# Patient Record
Sex: FEMALE | Race: ASIAN | NOT HISPANIC OR LATINO | Employment: FULL TIME | URBAN - METROPOLITAN AREA
[De-identification: names, ages, dates, MRNs, and addresses within clinical notes are randomized per-mention and may not be internally consistent; named-entity substitution may affect disease eponyms.]

---

## 2017-01-25 ENCOUNTER — AMBULATORY - HEALTHEAST (OUTPATIENT)
Dept: NURSING | Facility: CLINIC | Age: 47
End: 2017-01-25

## 2017-04-26 ENCOUNTER — AMBULATORY - HEALTHEAST (OUTPATIENT)
Dept: NURSING | Facility: CLINIC | Age: 47
End: 2017-04-26

## 2017-07-20 ENCOUNTER — AMBULATORY - HEALTHEAST (OUTPATIENT)
Dept: NURSING | Facility: CLINIC | Age: 47
End: 2017-07-20

## 2017-08-19 ENCOUNTER — COMMUNICATION - HEALTHEAST (OUTPATIENT)
Dept: FAMILY MEDICINE | Facility: CLINIC | Age: 47
End: 2017-08-19

## 2017-08-19 DIAGNOSIS — E03.9 HYPOTHYROIDISM: ICD-10-CM

## 2017-09-16 ENCOUNTER — COMMUNICATION - HEALTHEAST (OUTPATIENT)
Dept: FAMILY MEDICINE | Facility: CLINIC | Age: 47
End: 2017-09-16

## 2017-10-12 ENCOUNTER — OFFICE VISIT - HEALTHEAST (OUTPATIENT)
Dept: FAMILY MEDICINE | Facility: CLINIC | Age: 47
End: 2017-10-12

## 2017-10-12 DIAGNOSIS — E03.9 HYPOTHYROIDISM: ICD-10-CM

## 2017-10-12 DIAGNOSIS — E11.9 TYPE 2 DIABETES MELLITUS WITHOUT COMPLICATION, WITHOUT LONG-TERM CURRENT USE OF INSULIN (H): ICD-10-CM

## 2017-10-12 DIAGNOSIS — L20.82 FLEXURAL ECZEMA: ICD-10-CM

## 2017-10-12 DIAGNOSIS — Z00.00 ROUTINE GENERAL MEDICAL EXAMINATION AT A HEALTH CARE FACILITY: ICD-10-CM

## 2017-10-12 LAB
HBA1C MFR BLD: 7.1 % (ref 3.5–6)
LDLC SERPL CALC-MCNC: 78 MG/DL

## 2017-10-12 ASSESSMENT — MIFFLIN-ST. JEOR: SCORE: 1083.76

## 2017-10-13 LAB — HBV SURFACE AB SERPL IA-ACNC: NEGATIVE M[IU]/ML

## 2017-10-17 ENCOUNTER — AMBULATORY - HEALTHEAST (OUTPATIENT)
Dept: FAMILY MEDICINE | Facility: CLINIC | Age: 47
End: 2017-10-17

## 2017-10-17 ENCOUNTER — COMMUNICATION - HEALTHEAST (OUTPATIENT)
Dept: FAMILY MEDICINE | Facility: CLINIC | Age: 47
End: 2017-10-17

## 2017-11-17 ENCOUNTER — OFFICE VISIT - HEALTHEAST (OUTPATIENT)
Dept: FAMILY MEDICINE | Facility: CLINIC | Age: 47
End: 2017-11-17

## 2017-11-17 DIAGNOSIS — R47.01 APHASIC DISTURBANCE: ICD-10-CM

## 2017-11-17 DIAGNOSIS — E11.9 TYPE 2 DIABETES MELLITUS WITHOUT COMPLICATION (H): ICD-10-CM

## 2017-11-17 DIAGNOSIS — T38.2X5S: ICD-10-CM

## 2017-11-24 ENCOUNTER — HOSPITAL ENCOUNTER (OUTPATIENT)
Dept: CT IMAGING | Facility: CLINIC | Age: 47
Discharge: HOME | End: 2017-11-24
Attending: FAMILY MEDICINE

## 2017-11-24 DIAGNOSIS — R47.01 APHASIC DISTURBANCE: ICD-10-CM

## 2017-11-26 ENCOUNTER — COMMUNICATION - HEALTHEAST (OUTPATIENT)
Dept: NEUROSURGERY | Facility: CLINIC | Age: 47
End: 2017-11-26

## 2017-11-26 ENCOUNTER — COMMUNICATION - HEALTHEAST (OUTPATIENT)
Dept: FAMILY MEDICINE | Facility: CLINIC | Age: 47
End: 2017-11-26

## 2017-11-26 DIAGNOSIS — E03.9 HYPOTHYROIDISM: ICD-10-CM

## 2017-11-27 ENCOUNTER — COMMUNICATION - HEALTHEAST (OUTPATIENT)
Dept: NEUROSURGERY | Facility: CLINIC | Age: 47
End: 2017-11-27

## 2017-11-27 ENCOUNTER — COMMUNICATION - HEALTHEAST (OUTPATIENT)
Dept: FAMILY MEDICINE | Facility: CLINIC | Age: 47
End: 2017-11-27

## 2017-11-27 ENCOUNTER — AMBULATORY - HEALTHEAST (OUTPATIENT)
Dept: INTENSIVE CARE | Facility: CLINIC | Age: 47
End: 2017-11-27

## 2017-11-27 ENCOUNTER — RECORDS - HEALTHEAST (OUTPATIENT)
Dept: ADMINISTRATIVE | Facility: OTHER | Age: 47
End: 2017-11-27

## 2017-11-27 ENCOUNTER — AMBULATORY - HEALTHEAST (OUTPATIENT)
Dept: NEUROSURGERY | Facility: CLINIC | Age: 47
End: 2017-11-27

## 2017-11-27 DIAGNOSIS — D32.9 MENINGIOMA (H): ICD-10-CM

## 2017-11-27 DIAGNOSIS — Z01.818 PRE-OP EVALUATION: ICD-10-CM

## 2017-11-28 ENCOUNTER — AMBULATORY - HEALTHEAST (OUTPATIENT)
Dept: LAB | Facility: CLINIC | Age: 47
End: 2017-11-28

## 2017-11-28 ENCOUNTER — OFFICE VISIT - HEALTHEAST (OUTPATIENT)
Dept: NEUROSURGERY | Facility: CLINIC | Age: 47
End: 2017-11-28

## 2017-11-28 DIAGNOSIS — Z01.818 PRE-OP EVALUATION: ICD-10-CM

## 2017-11-28 DIAGNOSIS — D32.9 MENINGIOMA (H): ICD-10-CM

## 2017-11-29 ENCOUNTER — AMBULATORY - HEALTHEAST (OUTPATIENT)
Dept: NEUROSURGERY | Facility: CLINIC | Age: 47
End: 2017-11-29

## 2017-11-29 ENCOUNTER — COMMUNICATION - HEALTHEAST (OUTPATIENT)
Dept: NEUROSURGERY | Facility: CLINIC | Age: 47
End: 2017-11-29

## 2017-11-29 DIAGNOSIS — N39.0 UTI (URINARY TRACT INFECTION): ICD-10-CM

## 2017-11-29 DIAGNOSIS — Z01.818 PRE-OP EVALUATION: ICD-10-CM

## 2017-12-01 ENCOUNTER — AMBULATORY - HEALTHEAST (OUTPATIENT)
Dept: LAB | Facility: CLINIC | Age: 47
End: 2017-12-01

## 2017-12-01 ENCOUNTER — AMBULATORY - HEALTHEAST (OUTPATIENT)
Dept: NEUROLOGY | Facility: CLINIC | Age: 47
End: 2017-12-01

## 2017-12-01 ENCOUNTER — HOSPITAL ENCOUNTER (OUTPATIENT)
Dept: MRI IMAGING | Facility: CLINIC | Age: 47
Discharge: HOME | End: 2017-12-01
Attending: NEUROLOGICAL SURGERY

## 2017-12-01 DIAGNOSIS — D32.9 MENINGIOMA (H): ICD-10-CM

## 2017-12-01 DIAGNOSIS — G93.89: ICD-10-CM

## 2017-12-01 DIAGNOSIS — Z01.818 PRE-OP EVALUATION: ICD-10-CM

## 2017-12-03 ENCOUNTER — ANESTHESIA - HEALTHEAST (OUTPATIENT)
Dept: SURGERY | Facility: CLINIC | Age: 47
End: 2017-12-03

## 2017-12-04 ENCOUNTER — SURGERY - HEALTHEAST (OUTPATIENT)
Dept: SURGERY | Facility: CLINIC | Age: 47
End: 2017-12-04

## 2017-12-04 ASSESSMENT — MIFFLIN-ST. JEOR: SCORE: 1054.73

## 2017-12-05 ASSESSMENT — MIFFLIN-ST. JEOR: SCORE: 1044.25

## 2017-12-06 ENCOUNTER — COMMUNICATION - HEALTHEAST (OUTPATIENT)
Dept: NEUROSURGERY | Facility: CLINIC | Age: 47
End: 2017-12-06

## 2017-12-06 ASSESSMENT — MIFFLIN-ST. JEOR: SCORE: 1034.09

## 2017-12-07 ENCOUNTER — COMMUNICATION - HEALTHEAST (OUTPATIENT)
Dept: FAMILY MEDICINE | Facility: CLINIC | Age: 47
End: 2017-12-07

## 2017-12-07 ENCOUNTER — OFFICE VISIT - HEALTHEAST (OUTPATIENT)
Dept: FAMILY MEDICINE | Facility: CLINIC | Age: 47
End: 2017-12-07

## 2017-12-07 ENCOUNTER — AMBULATORY - HEALTHEAST (OUTPATIENT)
Dept: FAMILY MEDICINE | Facility: CLINIC | Age: 47
End: 2017-12-07

## 2017-12-07 DIAGNOSIS — E11.9 TYPE 2 DIABETES MELLITUS WITHOUT COMPLICATION, WITHOUT LONG-TERM CURRENT USE OF INSULIN (H): ICD-10-CM

## 2017-12-07 DIAGNOSIS — Z09 HOSPITAL DISCHARGE FOLLOW-UP: ICD-10-CM

## 2017-12-07 DIAGNOSIS — G93.89: ICD-10-CM

## 2017-12-09 ENCOUNTER — COMMUNICATION - HEALTHEAST (OUTPATIENT)
Dept: FAMILY MEDICINE | Facility: CLINIC | Age: 47
End: 2017-12-09

## 2017-12-11 ENCOUNTER — COMMUNICATION - HEALTHEAST (OUTPATIENT)
Dept: NEUROSURGERY | Facility: CLINIC | Age: 47
End: 2017-12-11

## 2017-12-12 ENCOUNTER — COMMUNICATION - HEALTHEAST (OUTPATIENT)
Dept: NEUROSURGERY | Facility: CLINIC | Age: 47
End: 2017-12-12

## 2017-12-12 DIAGNOSIS — D32.9 MENINGIOMA (H): ICD-10-CM

## 2017-12-15 ENCOUNTER — COMMUNICATION - HEALTHEAST (OUTPATIENT)
Dept: NEUROSURGERY | Facility: CLINIC | Age: 47
End: 2017-12-15

## 2017-12-15 ENCOUNTER — AMBULATORY - HEALTHEAST (OUTPATIENT)
Dept: NEUROSURGERY | Facility: CLINIC | Age: 47
End: 2017-12-15

## 2017-12-15 DIAGNOSIS — H60.90 OTITIS EXTERNA: ICD-10-CM

## 2017-12-18 ENCOUNTER — OFFICE VISIT - HEALTHEAST (OUTPATIENT)
Dept: FAMILY MEDICINE | Facility: CLINIC | Age: 47
End: 2017-12-18

## 2017-12-18 ENCOUNTER — AMBULATORY - HEALTHEAST (OUTPATIENT)
Dept: NEUROSURGERY | Facility: CLINIC | Age: 47
End: 2017-12-18

## 2017-12-18 ENCOUNTER — HOSPITAL ENCOUNTER (OUTPATIENT)
Dept: MRI IMAGING | Facility: CLINIC | Age: 47
Discharge: HOME | End: 2017-12-18
Attending: NEUROLOGICAL SURGERY

## 2017-12-18 DIAGNOSIS — D32.9 MENINGIOMA (H): ICD-10-CM

## 2017-12-18 DIAGNOSIS — Z48.02 REMOVAL OF STAPLES: ICD-10-CM

## 2017-12-18 DIAGNOSIS — E11.9 TYPE 2 DIABETES MELLITUS WITHOUT COMPLICATION, WITHOUT LONG-TERM CURRENT USE OF INSULIN (H): ICD-10-CM

## 2018-01-03 ENCOUNTER — COMMUNICATION - HEALTHEAST (OUTPATIENT)
Dept: NEUROSURGERY | Facility: CLINIC | Age: 48
End: 2018-01-03

## 2018-01-10 ENCOUNTER — AMBULATORY - HEALTHEAST (OUTPATIENT)
Dept: NURSING | Facility: CLINIC | Age: 48
End: 2018-01-10

## 2018-01-16 ENCOUNTER — OFFICE VISIT - HEALTHEAST (OUTPATIENT)
Dept: NEUROSURGERY | Facility: CLINIC | Age: 48
End: 2018-01-16

## 2018-01-16 DIAGNOSIS — D32.9 MENINGIOMA (H): ICD-10-CM

## 2018-03-16 ENCOUNTER — COMMUNICATION - HEALTHEAST (OUTPATIENT)
Dept: FAMILY MEDICINE | Facility: CLINIC | Age: 48
End: 2018-03-16

## 2018-03-16 DIAGNOSIS — E11.9 TYPE 2 DIABETES MELLITUS WITHOUT COMPLICATION, WITHOUT LONG-TERM CURRENT USE OF INSULIN (H): ICD-10-CM

## 2018-03-25 ENCOUNTER — COMMUNICATION - HEALTHEAST (OUTPATIENT)
Dept: FAMILY MEDICINE | Facility: CLINIC | Age: 48
End: 2018-03-25

## 2018-03-25 DIAGNOSIS — E11.9 TYPE 2 DIABETES MELLITUS WITHOUT COMPLICATION, WITHOUT LONG-TERM CURRENT USE OF INSULIN (H): ICD-10-CM

## 2018-03-26 ENCOUNTER — HOSPITAL ENCOUNTER (OUTPATIENT)
Dept: MRI IMAGING | Facility: HOSPITAL | Age: 48
Discharge: HOME | End: 2018-03-26

## 2018-03-26 DIAGNOSIS — D32.9 MENINGIOMA (H): ICD-10-CM

## 2018-03-26 LAB
CREAT BLD-MCNC: 0.6 MG/DL
CREAT BLD-MCNC: 0.6 MG/DL (ref 0.6–1.1)
POC GFR AMER AF HE - HISTORICAL: >60 ML/MIN/1.73M2
POC GFR NON AMER AF HE - HISTORICAL: >60 ML/MIN/1.73M2

## 2018-04-04 ENCOUNTER — AMBULATORY - HEALTHEAST (OUTPATIENT)
Dept: NURSING | Facility: CLINIC | Age: 48
End: 2018-04-04

## 2018-04-18 ENCOUNTER — COMMUNICATION - HEALTHEAST (OUTPATIENT)
Dept: FAMILY MEDICINE | Facility: CLINIC | Age: 48
End: 2018-04-18

## 2018-06-20 ENCOUNTER — COMMUNICATION - HEALTHEAST (OUTPATIENT)
Dept: FAMILY MEDICINE | Facility: CLINIC | Age: 48
End: 2018-06-20

## 2018-06-20 DIAGNOSIS — E11.9 TYPE 2 DIABETES MELLITUS WITHOUT COMPLICATION, WITHOUT LONG-TERM CURRENT USE OF INSULIN (H): ICD-10-CM

## 2018-07-03 ENCOUNTER — AMBULATORY - HEALTHEAST (OUTPATIENT)
Dept: NURSING | Facility: CLINIC | Age: 48
End: 2018-07-03

## 2018-09-12 ENCOUNTER — COMMUNICATION - HEALTHEAST (OUTPATIENT)
Dept: SCHEDULING | Facility: CLINIC | Age: 48
End: 2018-09-12

## 2018-09-19 ENCOUNTER — COMMUNICATION - HEALTHEAST (OUTPATIENT)
Dept: FAMILY MEDICINE | Facility: CLINIC | Age: 48
End: 2018-09-19

## 2018-09-19 DIAGNOSIS — E03.9 HYPOTHYROIDISM: ICD-10-CM

## 2018-10-25 ENCOUNTER — COMMUNICATION - HEALTHEAST (OUTPATIENT)
Dept: FAMILY MEDICINE | Facility: CLINIC | Age: 48
End: 2018-10-25

## 2018-10-25 ENCOUNTER — AMBULATORY - HEALTHEAST (OUTPATIENT)
Dept: NURSING | Facility: CLINIC | Age: 48
End: 2018-10-25

## 2018-10-25 LAB
HCG UR QL: NEGATIVE
SP GR UR STRIP: 1.02 (ref 1–1.03)

## 2018-12-26 ENCOUNTER — RECORDS - HEALTHEAST (OUTPATIENT)
Dept: ADMINISTRATIVE | Facility: OTHER | Age: 48
End: 2018-12-26

## 2019-01-17 ENCOUNTER — AMBULATORY - HEALTHEAST (OUTPATIENT)
Dept: NURSING | Facility: CLINIC | Age: 49
End: 2019-01-17

## 2019-03-21 ENCOUNTER — COMMUNICATION - HEALTHEAST (OUTPATIENT)
Dept: FAMILY MEDICINE | Facility: CLINIC | Age: 49
End: 2019-03-21

## 2019-03-21 DIAGNOSIS — E03.9 HYPOTHYROIDISM: ICD-10-CM

## 2019-04-23 ENCOUNTER — AMBULATORY - HEALTHEAST (OUTPATIENT)
Dept: NURSING | Facility: CLINIC | Age: 49
End: 2019-04-23

## 2019-04-23 LAB — HCG UR QL: NEGATIVE

## 2019-05-19 ENCOUNTER — COMMUNICATION - HEALTHEAST (OUTPATIENT)
Dept: FAMILY MEDICINE | Facility: CLINIC | Age: 49
End: 2019-05-19

## 2019-05-19 DIAGNOSIS — E03.9 HYPOTHYROIDISM: ICD-10-CM

## 2019-07-17 ENCOUNTER — COMMUNICATION - HEALTHEAST (OUTPATIENT)
Dept: FAMILY MEDICINE | Facility: CLINIC | Age: 49
End: 2019-07-17

## 2019-07-17 DIAGNOSIS — E11.9 TYPE 2 DIABETES MELLITUS WITHOUT COMPLICATION, WITHOUT LONG-TERM CURRENT USE OF INSULIN (H): ICD-10-CM

## 2019-07-19 ENCOUNTER — COMMUNICATION - HEALTHEAST (OUTPATIENT)
Dept: FAMILY MEDICINE | Facility: CLINIC | Age: 49
End: 2019-07-19

## 2019-07-19 DIAGNOSIS — Z30.42 ON DEPO-PROVERA FOR CONTRACEPTION: ICD-10-CM

## 2019-07-19 DIAGNOSIS — E03.9 ACQUIRED HYPOTHYROIDISM: ICD-10-CM

## 2019-07-19 DIAGNOSIS — R73.03 PREDIABETES: ICD-10-CM

## 2019-07-22 ENCOUNTER — COMMUNICATION - HEALTHEAST (OUTPATIENT)
Dept: FAMILY MEDICINE | Facility: CLINIC | Age: 49
End: 2019-07-22

## 2019-07-22 DIAGNOSIS — L20.82 FLEXURAL ECZEMA: ICD-10-CM

## 2019-07-24 ENCOUNTER — AMBULATORY - HEALTHEAST (OUTPATIENT)
Dept: NURSING | Facility: CLINIC | Age: 49
End: 2019-07-24

## 2019-08-27 ENCOUNTER — COMMUNICATION - HEALTHEAST (OUTPATIENT)
Dept: FAMILY MEDICINE | Facility: CLINIC | Age: 49
End: 2019-08-27

## 2019-10-17 ENCOUNTER — AMBULATORY - HEALTHEAST (OUTPATIENT)
Dept: LAB | Facility: CLINIC | Age: 49
End: 2019-10-17

## 2019-10-17 ENCOUNTER — AMBULATORY - HEALTHEAST (OUTPATIENT)
Dept: NURSING | Facility: CLINIC | Age: 49
End: 2019-10-17

## 2019-10-17 DIAGNOSIS — E03.9 HYPOTHYROIDISM: ICD-10-CM

## 2019-10-17 DIAGNOSIS — E03.9 ACQUIRED HYPOTHYROIDISM: ICD-10-CM

## 2019-10-17 DIAGNOSIS — R73.03 PREDIABETES: ICD-10-CM

## 2019-10-17 LAB
HBA1C MFR BLD: 6.5 % (ref 3.5–6)
T4 FREE SERPL-MCNC: <0.4 NG/DL (ref 0.7–1.8)
TSH SERPL DL<=0.005 MIU/L-ACNC: 91.11 UIU/ML (ref 0.3–5)

## 2019-10-18 ENCOUNTER — COMMUNICATION - HEALTHEAST (OUTPATIENT)
Dept: FAMILY MEDICINE | Facility: CLINIC | Age: 49
End: 2019-10-18

## 2019-10-18 DIAGNOSIS — E03.9 HYPOTHYROIDISM: ICD-10-CM

## 2019-12-17 ENCOUNTER — AMBULATORY - HEALTHEAST (OUTPATIENT)
Dept: FAMILY MEDICINE | Facility: CLINIC | Age: 49
End: 2019-12-17

## 2019-12-17 RX ORDER — LEVOTHYROXINE SODIUM 175 UG/1
TABLET ORAL
Status: SHIPPED | COMMUNITY
Start: 2016-07-11 | End: 2021-12-13

## 2020-01-14 ENCOUNTER — COMMUNICATION - HEALTHEAST (OUTPATIENT)
Dept: PEDIATRICS | Facility: CLINIC | Age: 50
End: 2020-01-14

## 2020-01-14 DIAGNOSIS — Z30.42 DEPOT CONTRACEPTION: ICD-10-CM

## 2020-01-14 DIAGNOSIS — Z30.42 ON DEPO-PROVERA FOR CONTRACEPTION: ICD-10-CM

## 2020-01-14 DIAGNOSIS — E03.9 ACQUIRED HYPOTHYROIDISM: ICD-10-CM

## 2020-04-22 ENCOUNTER — COMMUNICATION - HEALTHEAST (OUTPATIENT)
Dept: FAMILY MEDICINE | Facility: CLINIC | Age: 50
End: 2020-04-22

## 2020-04-22 DIAGNOSIS — L20.82 FLEXURAL ECZEMA: ICD-10-CM

## 2020-04-27 ENCOUNTER — COMMUNICATION - HEALTHEAST (OUTPATIENT)
Dept: FAMILY MEDICINE | Facility: CLINIC | Age: 50
End: 2020-04-27

## 2020-04-27 DIAGNOSIS — E03.9 HYPOTHYROIDISM: ICD-10-CM

## 2020-04-27 RX ORDER — LEVOTHYROXINE SODIUM 112 UG/1
TABLET ORAL
Qty: 90 TABLET | Refills: 1 | Status: SHIPPED | OUTPATIENT
Start: 2020-04-27 | End: 2021-07-06

## 2020-06-12 ENCOUNTER — COMMUNICATION - HEALTHEAST (OUTPATIENT)
Dept: FAMILY MEDICINE | Facility: CLINIC | Age: 50
End: 2020-06-12

## 2020-06-12 DIAGNOSIS — L20.82 FLEXURAL ECZEMA: ICD-10-CM

## 2020-06-12 RX ORDER — FLUOCINONIDE 0.5 MG/G
OINTMENT TOPICAL
Qty: 30 G | Refills: 0 | Status: SHIPPED | OUTPATIENT
Start: 2020-06-12 | End: 2021-07-16

## 2021-05-25 ENCOUNTER — RECORDS - HEALTHEAST (OUTPATIENT)
Dept: ADMINISTRATIVE | Facility: CLINIC | Age: 51
End: 2021-05-25

## 2021-05-26 NOTE — TELEPHONE ENCOUNTER
RN cannot approve Refill Request    RN can NOT refill this medication PCP messaged that patient is overdue for Labs and Office Visit.         Jessenia Rogre, [facility] Triage/Med Refill 3/22/2019    Requested Prescriptions   Pending Prescriptions Disp Refills     levothyroxine (SYNTHROID, LEVOTHROID) 112 MCG tablet [Pharmacy Med Name: LEVOTHYROXINE 112 MCG TABLET] 30 tablet 0     Sig: TAKE 1 TABLET BY MOUTH EVERY DAY    Thyroid Hormones Protocol Failed - 3/21/2019  1:18 AM       Failed - Provider visit in past 12 months or next 3 months    Last office visit with prescriber/PCP: 12/18/2017 Liliana Herrera MD OR same dept: Visit date not found OR same specialty: 12/18/2017 Liliana Herrera MD  Last physical: 10/12/2017 Last MTM visit: Visit date not found   Next visit within 3 mo: Visit date not found  Next physical within 3 mo: Visit date not found  Prescriber OR PCP: Liliana Herrera MD  Last diagnosis associated with med order: 1. Hypothyroidism  - levothyroxine (SYNTHROID, LEVOTHROID) 112 MCG tablet [Pharmacy Med Name: LEVOTHYROXINE 112 MCG TABLET]; TAKE 1 TABLET BY MOUTH EVERY DAY  Dispense: 30 tablet; Refill: 0    If protocol passes may refill for 12 months if within 3 months of last provider visit (or a total of 15 months).            Failed - TSH on file in past 12 months for patient age 12 & older    TSH   Date Value Ref Range Status   10/12/2017 1.66 0.30 - 5.00 uIU/mL Final

## 2021-05-27 ENCOUNTER — RECORDS - HEALTHEAST (OUTPATIENT)
Dept: ADMINISTRATIVE | Facility: CLINIC | Age: 51
End: 2021-05-27

## 2021-05-27 NOTE — TELEPHONE ENCOUNTER
Left message #1 at [phone] to call clinic to schedule appt with PCP for office visit for medication check and refills. Postponing task out to a week and will try again.

## 2021-05-28 NOTE — TELEPHONE ENCOUNTER
RN cannot approve Refill Request    RN can NOT refill this medication PCP messaged that patient is overdue for Labs and Office Visit.      Jessenia Roger, [facility] Triage/Med Refill 5/20/2019    Requested Prescriptions   Pending Prescriptions Disp Refills     levothyroxine (SYNTHROID, LEVOTHROID) 112 MCG tablet [Pharmacy Med Name: LEVOTHYROXINE 112 MCG TABLET] 30 tablet 0     Sig: TAKE 1 TABLET BY MOUTH EVERY DAY       Thyroid Hormones Protocol Failed - 5/19/2019  5:29 PM        Failed - Provider visit in past 12 months or next 3 months     Last office visit with prescriber/PCP: 12/18/2017 Liliana Herrera MD OR same dept: Visit date not found OR same specialty: 12/18/2017 Liliana Herrera MD  Last physical: 10/12/2017 Last MTM visit: Visit date not found   Next visit within 3 mo: Visit date not found  Next physical within 3 mo: Visit date not found  Prescriber OR PCP: Liliana Herrera MD  Last diagnosis associated with med order: 1. Hypothyroidism  - levothyroxine (SYNTHROID, LEVOTHROID) 112 MCG tablet [Pharmacy Med Name: LEVOTHYROXINE 112 MCG TABLET]; TAKE 1 TABLET BY MOUTH EVERY DAY  Dispense: 30 tablet; Refill: 0    If protocol passes may refill for 12 months if within 3 months of last provider visit (or a total of 15 months).             Failed - TSH on file in past 12 months for patient age 12 & older     TSH   Date Value Ref Range Status   10/12/2017 1.66 0.30 - 5.00 uIU/mL Final

## 2021-05-29 NOTE — TELEPHONE ENCOUNTER
Left message #1 at [phone] to call clinic to schedule appt with PCP for medication follow up. Postponing task out to a week and will try again.

## 2021-05-30 ENCOUNTER — RECORDS - HEALTHEAST (OUTPATIENT)
Dept: ADMINISTRATIVE | Facility: CLINIC | Age: 51
End: 2021-05-30

## 2021-05-30 NOTE — TELEPHONE ENCOUNTER
"Called and left message for pt to return call.# 1  \" Okay to relay message\"    Upon call back get her in to see Dr. Herrera.     MPW if patient arrives there is orders placed as well. And needs a BP per Dr. Herrera.    "

## 2021-05-30 NOTE — TELEPHONE ENCOUNTER
Pt have an appt at [facility] on 7/24/19. Will follow up with task tomorrow if pt shows up at [facility].

## 2021-05-30 NOTE — TELEPHONE ENCOUNTER
Pt is diabetic and has not had a checkup in 2 years!      Please add her to lab only visit and get labs and also BP check on this day.     I don't know how to order this when patient is not here.

## 2021-05-30 NOTE — TELEPHONE ENCOUNTER
Medication Request  Medication name: Freestyle Lite Lancets  Pharmacy Name and Location: [facility] #[id number]  Reason for request: New lancetNew  When did you use medication last?:  New Prescription  Patient offered appointment:  NA  Okay to leave a detailed message: yes

## 2021-05-30 NOTE — TELEPHONE ENCOUNTER
Patient did show up for depo. I explained to patient that Dr. Herrera wants me the check her blood pressure and labs. She declined b/p check and labs. She stated that she is only here for a shot nothing else. She also stated she will call to make an appointment to see Dr. Herrera.

## 2021-05-30 NOTE — TELEPHONE ENCOUNTER
RN cannot approve Refill Request    RN can NOT refill this medication med is not covered by policy/route to provider     . Last office visit: 12/18/2017 Liliana Herrera MD Last Physical: 10/12/2017 Last MTM visit: Visit date not found Last visit same specialty: 12/18/2017 Liliana Herrera MD.  Next visit within 3 mo: Visit date not found  Next physical within 3 mo: Visit date not found      Jessenia Roger, [facility] Triage/Med Refill 7/22/2019    Requested Prescriptions   Pending Prescriptions Disp Refills     fluocinonide (LIDEX) 0.05 % ointment [Pharmacy Med Name: FLUOCINONIDE 0.05% OINTMENT] 30 g 11     Sig: APPLY TOPICALLY 2 (TWO) TIMES A DAY.       There is no refill protocol information for this order

## 2021-05-31 ENCOUNTER — RECORDS - HEALTHEAST (OUTPATIENT)
Dept: ADMINISTRATIVE | Facility: CLINIC | Age: 51
End: 2021-05-31

## 2021-05-31 VITALS — WEIGHT: 102.7 LBS | HEIGHT: 62 IN | BODY MASS INDEX: 18.9 KG/M2

## 2021-05-31 VITALS — BODY MASS INDEX: 20.74 KG/M2 | WEIGHT: 112 LBS

## 2021-05-31 VITALS — BODY MASS INDEX: 21.16 KG/M2 | WEIGHT: 115 LBS | HEIGHT: 62 IN

## 2021-05-31 VITALS — BODY MASS INDEX: 21.75 KG/M2 | WEIGHT: 117 LBS

## 2021-05-31 VITALS — BODY MASS INDEX: 19.94 KG/M2 | WEIGHT: 109 LBS

## 2021-05-31 VITALS — BODY MASS INDEX: 20.12 KG/M2 | WEIGHT: 110 LBS

## 2021-05-31 NOTE — TELEPHONE ENCOUNTER
Left message #2 at [phone]. Sending letter out and postponing task out to 2 weeks and will try again if an appointment hasn't been made.

## 2021-06-01 NOTE — TELEPHONE ENCOUNTER
Left message #3 at [phone] to call clinic to schedule appt with PCP for a medication refill. We have made several attempts to contact patient by phone and letter to schedule an appointment. Unfortunately, our calls have not been returned and we were unable to schedule. At this time, we will no longer make an attempt to schedule this appointment. Completing task.

## 2021-06-02 NOTE — TELEPHONE ENCOUNTER
Left detailed message with patient regarding Dr. Herrera's message.  Told patient to call clinic with any questions. Closing encounter

## 2021-06-02 NOTE — TELEPHONE ENCOUNTER
----- Message from Raymon Salazar MD sent at 10/18/2019  5:04 PM CDT -----  Please call to resume thyroid med.  I send script   check with pcp six wks

## 2021-06-02 NOTE — TELEPHONE ENCOUNTER
Patient Returning Call  Reason for call:  Returning call  Information relayed to patient:    Relayed below information to patient.  Patient has additional questions:  Yes  If YES, what are your questions/concerns:    Patient is requesting the script for levothyroxine be sent to [facility] in [place], [place]  Okay to leave a detailed message?: Yes

## 2021-06-02 NOTE — TELEPHONE ENCOUNTER
Prescription sent to pharmacy    Please let pt know I want to check her thyroid blood test again when she gets her next depo shot

## 2021-06-02 NOTE — TELEPHONE ENCOUNTER
Spoke to the pt and per pt is going to find out which pharmacy to use then we will either need to verbally call it in or have the doctor resend Rx to the correct pharmacy. I called [facility] pharmacy to cancel the Rx as per pt said that she does not use that pharmacy.     Please relay the rest of the message as she hung up on me after telling me about her pharmacy.

## 2021-06-05 NOTE — TELEPHONE ENCOUNTER
Patient is coming in for a nurse only visit on 1/15/20 for a Depo shot. order is  please place new standing order.

## 2021-06-05 NOTE — TELEPHONE ENCOUNTER
Pt turns 50 next month.     Usually we stop depo shots for birth control after they turn 50 and see if they have gone through menopause.     Okay to do 1 more depo shot and then would recommend a check up with me this spring to see how she is doing.      I would like her to get her thyroid level checked again when [facility] comes for her depo shot.

## 2021-06-07 NOTE — TELEPHONE ENCOUNTER
Medication Request  Medication name:   fluocinonide (LIDEX) 0.05 % ointment  30 g  11  10/12/2017      Sig: Apply topically 2 (two) times a day.     Patient taking differently: Apply topically 2 (two) times a day as needed (apply to feet). Apply topically 2 (two) times a day.             Requested Pharmacy: [facility]  Reason for request: States her feet are itchy again and this worked well last time.  States would like a 3 month supply.  Okay to leave a detailed message: yes  Please call when medication has been sent to the pharmacy.

## 2021-06-08 NOTE — PROGRESS NOTES
Patient came in for a depo provera injection.  Patient tolerated injection well.    Jennifer Laird, [facility]

## 2021-06-10 NOTE — PROGRESS NOTES
Patient came into the clinic for a depo provera injection. Patient tolerated well.    Jennifer Laird, [facility]

## 2021-06-13 NOTE — PROGRESS NOTES
[facility] Maintenance Exam  [facility]      Assessment/Plan     1.  Healthy female exam.   Health maintenance discussed as appropriate for age and risk factors including:  Nutrition, exercise, alcohol use, tobacco use, safe sexual practices, dental health.  Cancer screening assessed and ordered as indicated. Routine labs as outlined below based on age and risk factors reviewed today. Immunizations reviewed and updated.       1. Hypothyroidism  Well controlled and asymptomatic- continue current dose and recheck in 1 year.    - levothyroxine (SYNTHROID, LEVOTHROID) 112 MCG tablet; Take 1 tablet (112 mcg total) by mouth Daily at 6:00 am.  Dispense: 90 tablet; Refill: 4  - Thyroid Cascade    2. Flexural eczema  Stable with prn steroid refilled below.    - fluocinonide (LIDEX) 0.05 % ointment; Apply topically 2 (two) times a day.  Dispense: 30 g; Refill: 11    3. Type 2 diabetes mellitus without complication, without long-term current use of insulin  Addressed smoking status and aspirin therapy.  Recommended annual eye exam and dental cares. Reviewed foot cares and foot exam.  Blood pressure and lipid management reviewed today.  Vaccines reviewed and updated.  Plan for glucose management includes ongoing focus on healthy diabetic diet and increased activity, pt advised to start metformin at this point but she is very resistant to starting meds and is still determined to make some changes to her diet.  Open to meeting with diabetes educator at this point.   Labs ordered as below including:     Lab Results   Component Value Date    HGBA1C 7.1 (H) 10/12/2017   , No results found for: LDL,   Lab Results   Component Value Date    CREATININE 0.67 10/12/2017       - Glycosylated Hemoglobin A1c  - Microalbumin, Random Urine  - Basic Metabolic Panel  - LDL Cholesterol, Direct  - Hepatitis B Surface Antibody (Anti-HBs) Vaccine Check  - Ambulatory referral to Diabetes Education (Existing Diagnosis)    4. Routine  general medical examination at a health care facility  - Thyroid Cascade  - Vitamin D, Total (25-Hydroxy)  - Basic Metabolic Panel  - LDL Cholesterol, Direct  - Hepatitis B Surface Antibody (Anti-HBs) Vaccine Check    Administrations This Visit     medroxyPROGESTERone injection 150 mg (DEPO-PROVERA)     Admin Date Action Dose Route Administered By             10/12/2017 Given 150 mg Intramuscular Rosario Rodríguez CMA                        No Follow-up on file.    Patient Education/AVS:  There are no Patient Instructions on file for this visit.    HPI:      Chief Complaint   Patient presents with     Annual Exam     Diabetes        Queta Villarreal is a 47 y.o. female and is here for a comprehensive physical exam.     Other concerns today:   Depo shot today  Diabetes checkup  Will get flu shot at work      Healthy Habits: Body mass index is 21.3 kg/(m^2).  Regular Exercise: walking 1 hr 3-4 days/week.    Healthy Diet:very little rice, lots of fruits and veggies, soups, Grenadian foods, No meat- Gnosticism  Smoking:   History   Smoking Status     Never Smoker   Smokeless Tobacco     Never Used     Dental Visits Regularly: Yes  Eye exams: Yes  Pregnancy planning: no   History   Sexual Activity     Sexual activity: Yes     Partners: Male     Birth control/ protection: Injection     Seat Belt: Yes  Prevention of Osteoporosis:Yogurt daily  Last Dexa:na    Do you take any herbs or supplements that were not prescribed by a doctor? multivitamins and calcium but not daily      Cancer Screening:  Hemoccults/Colonoscopy: na  Mammogram:  None yet  Pap Smear:  Normal in 2016- no HPV collected. Recheck 2019  Lung Cancer: na      Immunization History   Administered Date(s) Administered     DT (pediatric) 01/09/2001     Hep A, historic 01[birth year]01/1900     Hep B, historic 01[birth year]01/1900     Influenza, inj, historic 10/19/2007, 12/11/2008, 10/12/2016     MMR 01/21/2008     Td, Adult, Absorbed 10/12/2017     Td, historic 02/07/2008     Tdap 02/07/2008        OB History    Para Term  AB Living   4 3 3  1 3   SAB TAB Ectopic Multiple Live Births    1         # Outcome Date GA Lbr Margarito/2nd Weight Sex Delivery Anes PTL Lv   4 Term            3 Term            2 Term            1 TAB                   Meds  Current Outpatient Prescriptions on File Prior to Visit   Medication Sig Dispense Refill     blood glucose meter (GLUCOMETER) Check Blood Sugar one times daily 1 each 0     blood glucose test (FREESTYLE LITE STRIPS) strips Check Blood Sugar one time daily. 100 each 11     generic lancets Check blood sugar one times a day. 100 each 11     Current Facility-Administered Medications on File Prior to Visit   Medication Dose Route Frequency Provider Last Rate Last Dose     medroxyPROGESTERone injection 150 mg (DEPO-PROVERA)  150 mg Intramuscular Q3 Months Lucille Flannery PA-C   150 mg at 10/12/17 1441       Past Medical History  Past Medical History:   Diagnosis Date     Gestational diabetes        Surgical History  History reviewed. No pertinent surgical history.    Allergies  Review of patient's allergies indicates no known allergies.    Family History  Family History   Problem Relation Age of Onset     No Medical Problems Mother      Diabetes type II Father      Lung cancer Paternal Uncle      No Medical Problems Sister      No Medical Problems Brother      No Medical Problems Sister      No Medical Problems Brother        Social History  Social History     Social History     Marital status: Single     Spouse name: Jimenez      Number of children: 3     Years of education: N/A     Occupational History     Accounting      Social History Main Topics     Smoking status: Never Smoker     Smokeless tobacco: Never Used     Alcohol use No     Drug use: No     Sexual activity: Yes     Partners: Male     Birth control/ protection: Injection     Other Topics Concern     Not on file     Social History Narrative    Lives with boyfriend and daughter, 2 sons  "        Review of Systems   Complete ROS including General, HEENT, CV, Pulmonary, GI, , Genital, Neuro, Psych, MSK, Heme, Endocrine all within normal except as noted in the HPI.      Objective:   /72 (Patient Site: Right Arm, Patient Position: Sitting, Cuff Size: Adult Regular)  Pulse 88  Temp 97.5  F (36.4  C) (Oral)   Resp 16  Ht 5' 1.61\" (1.565 m)  Wt 115 lb (52.2 kg)  BMI 21.3 kg/m2 Body mass index is 21.3 kg/(m^2).  Wt Readings from Last 3 Encounters:   10/12/17 115 lb (52.2 kg)   07/20/17 117 lb (53.1 kg)   10/27/16 118 lb (53.5 kg)     No Data Recorded  No Data Recorded  No Data Recorded    Complete physical exam including:  General, HEENT, Neck, breast, back, CV, Pulmonary, Abdominal, extremities, skin, neuro, psych, lymph, genital exams all within normal.    Abnormalities include:  All normal.  Pelvic exam deferred b/c pt asymptomatic and not due for screening.       Results for orders placed or performed in visit on 10/12/17   Glycosylated Hemoglobin A1c   Result Value Ref Range    Hemoglobin A1c 7.1 (H) 3.5 - 6.0 %   Microalbumin, Random Urine   Result Value Ref Range    Microalbumin, Random Urine <0.50 0.00 - 1.99 mg/dL    Creatinine, Urine 37.2 mg/dL    Microalbumin/Creatinine Ratio Random Urine  <=19.9 mg/g   Thyroid Cascade   Result Value Ref Range    TSH 1.66 0.30 - 5.00 uIU/mL   Basic Metabolic Panel   Result Value Ref Range    Sodium 140 136 - 145 mmol/L    Potassium 4.7 3.5 - 5.0 mmol/L    Chloride 106 98 - 107 mmol/L    CO2 23 22 - 31 mmol/L    Anion Gap, Calculation 11 5 - 18 mmol/L    Glucose 129 (H) 70 - 125 mg/dL    Calcium 9.1 8.5 - 10.5 mg/dL    BUN 6 (L) 8 - 22 mg/dL    Creatinine 0.67 0.60 - 1.10 mg/dL    GFR MDRD Af Amer >60 >60 mL/min/1.73m2    GFR MDRD Non Af Amer >60 >60 mL/min/1.73m2   LDL Cholesterol, Direct   Result Value Ref Range    Direct LDL 78 <=129 mg/dl   Hepatitis B Surface Antibody (Anti-HBs) Vaccine Check   Result Value Ref Range    HBV Surface Ab (Vaccine " Check) Negative (!) Positive

## 2021-06-14 NOTE — PROGRESS NOTES
Queta Villarreal is status post Left craniotomy and resection of meningioma on 12/4/2017 by Dr. Gutiérrez.  Today she presents in follow up. She is accompanied by her sister. She c/o Right ear swelling, redness and TTP - consistent with otitis externa. Denies HA or any other neuro symptoms. Gait and balance are normal. Speech is fluent. Cognition is intact. Staples intact. Wound CDI.  eRx for keflex.RTC on 12/18/2017.  Dina Romero RN, CNRN

## 2021-06-14 NOTE — ANESTHESIA CARE TRANSFER NOTE
Last vitals:   Vitals:    12/04/17 1214   BP: 128/66   Pulse: 76   Resp: 16   Temp: 36.4  C (97.5  F)   SpO2: 100%     Patient's level of consciousness is awake and drowsy  Spontaneous respirations: yes  Maintains airway independently: yes  Dentition unchanged: yes  Oropharynx: oropharynx clear of all foreign objects    QCDR Measures:  ASA# 20 - Surgical Safety Checklist: WHO surgical safety checklist completed prior to induction  PQRS# 430 - Adult PONV Prevention: 4558F - Pt received => 2 anti-emetic agents (different classes) preop & intraop  ASA# 8 - Peds PONV Prevention: NA - Not pediatric patient, not GA or 2 or more risk factors NOT present  PQRS# 424 - Marci-op Temp Management: 4559F - At least one body temp DOCUMENTED => 35.5C or 95.9F within required timeframe  PQRS# 426 - PACU Transfer Protocol: - Transfer of care checklist used  ASA# 14 - Acute Post-op Pain: ASA14B - Patient did NOT experience pain >= 7 out of 10

## 2021-06-14 NOTE — ANESTHESIA POSTPROCEDURE EVALUATION
Patient: Queta Villarreal  LEFT CRANIOTOMY FOR RESECTION OF INTRACRANIAL EXTRA AXIAL TUMOR  Anesthesia type: general    Patient location: PACU  Last vitals:   Vitals:    12/04/17 1400   BP:    Pulse: 84   Resp: 15   Temp:    SpO2: 98%     Post vital signs: stable  Level of consciousness: awake, alert and oriented  Post-anesthesia pain: pain controlled  Post-anesthesia nausea and vomiting: no  Pulmonary: unassisted, return to baseline  Cardiovascular: stable and blood pressure at baseline  Hydration: adequate  Anesthetic events: no    QCDR Measures:  ASA# 11 - Marci-op Cardiac Arrest: ASA11B - Patient did NOT experience unanticipated cardiac arrest  ASA# 12 - Marci-op Mortality Rate: ASA12B - Patient did NOT die  ASA# 13 - PACU Re-Intubation Rate: ASA13B - Patient did NOT require a new airway mgmt  ASA# 10 - Composite Anes Safety: ASA10A - No serious adverse event    Additional Notes:  BS elevated after decadron given in surgery.  Will continue to monitor and treat.  Sign out given to intensivist.

## 2021-06-14 NOTE — ANESTHESIA PROCEDURE NOTES
Arterial Line  Reason for Procedure: hemodynamic monitoring  Patient location during procedure: Pre-op  Start time: 12/4/2017 6:48 AM  End time: 12/4/2017 6:54 AM  Staffing:  Performing  Anesthesiologist: TO VENTURA  Sterile Precautions:  sterile barriers used during insertion: cap, mask, sterile gloves, large sheet, and hand hygiene used.  Arterial Line:     Laterality: right  Location: radial  Prepped with: ChloroPrep    Needle gauge: 20 G  Number of Attempts: 1  Secured with: tape and transparent dressing  Flushed with: saline

## 2021-06-14 NOTE — PROGRESS NOTES
Unable to express a full sentence.  Three wks ago.  2 wks ago unable to talk, then came back.    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   Walk daily normal.   Swallow okay.   Normal vision. Denies diplopia    81  A1c 7.1  No meds for diabetes mellitus   Diabetes denies polyuria.   On thyroid med 112.  Energy same    Walks daily one hour.    Lives with boyfriend and two sons and one daughter.    Neg cig  bp have been good  ldl 78    Trouble at work. They think I am speaking another language.      On depo provera    ROS: as noted above    OBJECTIVE:   Vitals:    11/17/17 1055   BP: 115/72   Pulse: 95   Resp: 20   Temp: 98.9  F (37.2  C)      Wt is noted.  No diaphoresis  Eyes: nl eom, anicteric   External ears, nose: nl    Neck: nl nodes, supple, thyroid normal   Lungs: clear to ausc   Heart: regular rhythm  Abd: soft nontender   No cva (renal) tenderness  Neuro: no weakness  Neuro exam:  Alert and oriented  PERRL  Full conjugate eom    Speech is halting and some words are not consistent with fluent English but could be nl for ELL.     This is my first visit with her.     When asked to name thumb she says finger.  Gait normal  Normal romberg  No drift of upper extremities  Reflexes symmetric  No tremor  No cogwheeling    Skin no rash  Joints: uninflamed   No ketotic breath odor noted  Mental: anxious  Ext: nontender calves   Gait: normal    Bmi:20    ASSESSMENT/PLAN:    Additional diagnoses and related orders:  1. Aphasic disturbance  Ambulatory referral to Neurology    CT Head Without Contrast   2. Adverse effect of antithyroid drug, sequela     3. Type 2 diabetes mellitus without complication       follow up post

## 2021-06-14 NOTE — PROGRESS NOTES
Subjective:    Queta Villarreal is a 47 y.o. female who presents for evaluation of hospital discharge follow-up.  She is admitted to the hospital on 12/4/2017, had surgery for meningioma and brain compression.  She was discharged yesterday, 12/6/2017.  I reviewed discharge summary.  She had a left craniotomy for resection of intracranial extra axial tumor.  Surgery was without complications.  She was sent home with tapering doses of dexamethasone, in addition to her other regular medicines.  She is taking all of these medicines as directed.  She is following recommendations for gentle activity, no driving, no work.  She is having some constipation.  Pain is pretty well managed.  She has a follow-up with neurosurgery in about 2 weeks.  She is here with family today.  Overall, she thinks she is doing pretty well.  Her family feel that her speech and other mental capacities have improved since surgery.  Hospital providers wanted us to review her blood sugars today.  12/6 = 278  12/5 = 185  12/4 = 248    Patient Active Problem List   Diagnosis     Eczema     Alopecia Areata     Lower Back Sprain     Neck Strain     Hypothyroidism Post Radio-iodine Treatment     Type 2 diabetes mellitus without complication, without long-term current use of insulin     On Depo-Provera for contraception     Mass of cerebral hemisphere     Left frontal lobe mass     Acquired hypothyroidism     Meningioma     Neoplasm causing mass effect on adjacent structures     Brain compression       Current Outpatient Prescriptions:      acetaminophen (TYLENOL) 325 MG tablet, Take 1-2 tablets by mouth every 6 hours, as needed for pain., Disp: 100 tablet, Rfl: 0     dexamethasone (DECADRON) 2 MG tablet, 2 tabs tid x 1 day, then 1 tab TID x 2 days the 1 tab BID x 2 days, then 1 tab daily x 2., Disp: 18 tablet, Rfl: 0     famotidine (PEPCID) 20 MG tablet, Take 1 tablet (20 mg total) by mouth 2 (two) times a day., Disp: 60 tablet, Rfl: 3     fluocinonide  "(LIDEX) 0.05 % ointment, Apply topically 2 (two) times a day. (Patient taking differently: Apply topically 2 (two) times a day as needed (apply to feet). Apply topically 2 (two) times a day.), Disp: 30 g, Rfl: 11     levETIRAcetam (KEPPRA) 500 MG tablet, Take 1 tablet (500 mg total) by mouth 2 (two) times a day., Disp: 10 tablet, Rfl: 0     oxyCODONE (ROXICODONE) 5 MG immediate release tablet, 1 tab for pain rated 5-7 and 2 tabs for pain rated 8-10 every 4 hours as needed. This will not be refilled., Disp: 30 tablet, Rfl: 0     senna-docusate (PERICOLACE) 8.6-50 mg tablet, Take 1 tablet by mouth 2 (two) times a day. As needed for constipation, Disp: 60 tablet, Rfl: 0     sulfamethoxazole-trimethoprim (SEPTRA DS) 800-160 mg per tablet, Take 1 tablet by mouth 2 (two) times a day for 10 days., Disp: 20 tablet, Rfl: 0     insulin glargine (LANTUS SOLOSTAR) 100 unit/mL (3 mL) pen, Inject 10 Units under the skin daily. Do not mix Lantus with any other insulin, Disp: 5 adj dose pen, Rfl: 6     levothyroxine (SYNTHROID, LEVOTHROID) 112 MCG tablet, Take 1 tablet (112 mcg total) by mouth Daily at 6:00 am., Disp: 90 tablet, Rfl: 4     pen needle, diabetic (BD ULTRA-FINE MARIALUISA PEN NEEDLES) 32 gauge x 5/32\" Ndle, Use to inject insulin daily, Disp: 100 each, Rfl: 6    Current Facility-Administered Medications:      medroxyPROGESTERone injection 150 mg (DEPO-PROVERA), 150 mg, Intramuscular, Q3 Months, Lucille Flannery PA-C, 150 mg at 10/12/17 1441     Objective:   Allergies:  Review of patient's allergies indicates no known allergies.    Vitals:  Vitals:    12/07/17 1013   BP: 120/70   Pulse: 88   Resp: 20   Temp: 97.9  F (36.6  C)   SpO2: 98%     Body mass index is 20.12 kg/(m^2).    Vital signs reviewed.  General: Patient is alert and oriented x 3, in no apparent distress, appropriately groomed with normal affect  Cardiac: regular rate and rhythm, no murmurs  Pulmonary: lungs clear to auscultation bilaterally, no crackles, " rales, rhonchi, or wheezing noted  Musculoskeletal: Patient walks a normal tandem gait  Skin: Incision site on scalp is healthy and normal, no signs of infection    Assessment and Plan:   1.  Hospital discharge follow-up for craniotomy for resection of intracranial extra-axial tumor.  Overall patient is doing well.  Appetite is pretty good.  She has some constipation.  We reviewed using Colace and increasing water intake.  If Colace is not helpful, they will contact us.  We can try MiraLAX or something else.  She is taking her medicines as directed.  She has a follow-up appointment with neurosurgery in 2 weeks.  Family was wondering if she could get more of the stockingette gauze that is used on her head to cover her staples.  I ordered that through [facility] today.  Family will pick it up themselves.  They will let us know if any problems.  Incision site appears healthy, no signs of infection.    2.  Type 2 diabetes, status post surgery and currently taking dexamethasone.  Lab Results   Component Value Date    HGBA1C 7.1 (H) 10/12/2017   She normally does not take any medicine for her diabetes.  At the hospital her blood sugars were elevated, see HPI.  She is on dexamethasone for 6 more days.  I reviewed this with her PCP, Dr. Herrera.  I started her on Lantus 10 units daily.  She can continue to check her sugars a few times a day.  Try to maintain sugars at 140s-180s.  I reviewed, in detail, what to do if she has low or high sugars.  Sample Lantus insulin pen given to patient today.  Our diabetic educator Serena was able to personally review proper use of pen and needles with patient and family.  She or family will contact us if any questions.  All of their questions were answered today.  Follow-up appointment with Dr. Herrera in about 2 weeks.    This dictation uses voice recognition software, which may contain typographical errors.

## 2021-06-14 NOTE — PROGRESS NOTES
[facility] Office Visit    Chief Complaint:  Chief Complaint   Patient presents with     Follow-up     patient wondering if she needs to take pepcid         Assessment/Plan:  1. Type 2 diabetes mellitus without complication, without long-term current use of insulin  Addressed smoking status and aspirin therapy.  Recommended annual eye exam and dental cares. Reviewed foot cares and foot exam.  Blood pressure and lipid management reviewed today.  Vaccines reviewed and updated.  Plan for glucose management includes ongoing focus on healthy diabetic diet and increased activity, hyperglycemia lately after brain surgery and postop steroids.  Anticipate thsi will improve over next couple of weeks.  Pt wants to start oral meds.  Titrate up on metformin to 500mg bid and f/u in 4 weeks with sugars to review.  .  Labs ordered as below including:     Lab Results   Component Value Date    HGBA1C 7.1 (H) 10/12/2017   , No results found for: LDL,   Lab Results   Component Value Date    CREATININE 0.67 12/05/2017       - metFORMIN (GLUCOPHAGE) 500 MG tablet; Take 1 tablet (500 mg total) by mouth 2 (two) times a day with meals.  Dispense: 180 tablet; Refill: 0    Return in about 4 weeks (around 1/15/2018) for Recheck.    Patient Education/AVS:  Patient Instructions   Start metformin 500mg daily for 1-2 weeks and then increase to 500mg twice a day    Stop the shots when you start taking your pill twice a day    Okay to stop pepcid now      HPI:   Queta Villarreal is a 47 y.o. female c/o f/u after her surgery.  Wondering if she can go back to work.  F/u with surgery today.  Wondering when she can drive- no longer taking pain pills.  Family notes that she still has slow response and some word finding difficulties.  Sister is here with her today and notes she is 90% back to normal.     Diabetes- off steroids.  Started lantus 10u at bedtime while sugars were high.  Fasting sugar today 117.  14d average 202    12/17- 237  6pm  12/15 271 9pm  12/15 117 fam  12/14 147 4am (started insulin this day)  12/11 230 7pm    Wants me to check her ear to make sure infection is cleared up.  Still on keflex.  Still having pain and swelling in the right jaw.      ROS:  Constitutional, CV, Resp, GI, , MSK, skin, neuro, psych all negative except as outlined in the HPI above.    History summarized from1-2:hospital f/u 12/7/17 noting hyperglycemia in face of oral steroids postop.  Started on lantus 10u daily.    Lab tests reviewed-1: 2017    Physical Exam:  /72 (Patient Site: Right Arm, Patient Position: Sitting, Cuff Size: Adult Regular)  Pulse 70  Temp 98.4  F (36.9  C) (Oral)   Resp 20  Wt 109 lb (49.4 kg)  BMI 19.94 kg/m2 Body mass index is 19.94 kg/(m^2). No LMP recorded. Patient has had an injection.  Vital signs reviewed  Wt Readings from Last 3 Encounters:   12/18/17 109 lb (49.4 kg)   12/07/17 110 lb (49.9 kg)   12/06/17 102 lb 11.2 oz (46.6 kg)     History   Smoking Status     Never Smoker   Smokeless Tobacco     Never Used     History   Sexual Activity     Sexual activity: Yes     Partners: Male     Birth control/ protection: Injection     No Data Recorded  No Data Recorded  PHQ-2 Total Score: 0 (11/17/2017 10:55 AM)  No Data Recorded    All normal as below except abnormalities include: pt appears well- here with sister today to help with driving, cares and history.  Well healed scar on front of scalp- staples are still in place.  Wound is clean/dry/intact.  Both ears appear mildly red/woody with sloughing skin.  No warmth/tenderness or lymph nodes.    General is a  47 y.o. female sitting comfortably in no apparent distress.   HEENT:  TM are clear bilaterally.  Eye, nasal, oral exams within normal   Neck: Supple without lymphadenopathy or thyromegally  CV: Regular rate and rhythm S1S2 without rubs, murmurs or gallops,   Lungs: Clear to auscultation bilaterally  Extremities: Warm, No Edema, 2+ Pedal and radial pulses  bilaterally  Skin: No lesions noted  Neuro/MSK: Able to ambulate around the exam room with equal movement, strength and normal coordination of the upper and lower extremeties symmetrically    Results for orders placed or performed during the hospital encounter of 12/04/17   Type and Screen   Result Value Ref Range    ABORh O POS     Antibody Screen Negative Negative   Pregnancy, urine   Result Value Ref Range    Pregnancy Test, Urine Negative Negative    Specific Gravity, UA 1.012 1.001 - 1.030   Potassium   Result Value Ref Range    Potassium 3.8 3.5 - 5.0 mmol/L   Magnesium   Result Value Ref Range    Magnesium 2.4 1.8 - 2.6 mg/dL   Potassium   Result Value Ref Range    Potassium 4.2 3.5 - 5.0 mmol/L   Magnesium   Result Value Ref Range    Magnesium 1.9 1.8 - 2.6 mg/dL   Creatinine   Result Value Ref Range    Creatinine 0.67 0.60 - 1.10 mg/dL    GFR MDRD Af Amer >60 >60 mL/min/1.73m2    GFR MDRD Non Af Amer >60 >60 mL/min/1.73m2   Potassium   Result Value Ref Range    Potassium 4.4 3.5 - 5.0 mmol/L   Magnesium   Result Value Ref Range    Magnesium 2.4 1.8 - 2.6 mg/dL   POCT Glucose   Result Value Ref Range    Glucose,  mg/dL   POCT Glucose   Result Value Ref Range    Glucose,  mg/dL   POCT Glucose   Result Value Ref Range    Glucose,  mg/dL   POCT Glucose   Result Value Ref Range    Glucose,  mg/dL   POCT Glucose   Result Value Ref Range    Glucose,  mg/dL   POCT Glucose   Result Value Ref Range    Glucose,  mg/dL   POCT Glucose   Result Value Ref Range    Glucose,  mg/dL   POCT Glucose   Result Value Ref Range    Glucose,  mg/dL   POCT Glucose   Result Value Ref Range    Glucose,  mg/dL   Surgical Pathology Exam   Result Value Ref Range    Case Report       Surgical Pathology Report                         Case: [id number]                                    Authorizing Provider:  Gabbi Gutiérrez MD    Collected:           12/04/2017 1028             "  Ordering Location:     [facility] OR   Received:            12/04/2017 1038              Pathologist:           Drake Rodríguez MD                                                       Specimens:   A) - Tissue, meningioma                                                                             B) - Tissue, meningioma                                                                    Final Diagnosis       INTRACRANIAL EXTRA-AXIAL TUMOR, RESECTION:    -  MENINGIOMA, MIXED FIBROUS AND MENINGOTHELIAL TYPES (W.H.O. GRADE I);          SEE COMMENT BELOW    MCSS    Comment       Although this tumor is classifiable as a WHO grade I meningioma, it has some features of a higher grade tumor. The tumor shows markedly increased cellularity, and in most of the tumor the cells have prominent nucleoli. These are two of the minor grading criteria for an atypical or grade II meningioma (3 or more are necessary for grade II).     Despite the increase in cellularity, and the increased variability in nuclear size and shape, the tumor demonstrates fewer than 4 mitoses/10 HPF, and the tumor has a Ki-67 (MIB-1) index of only 1-2%. Mitotic rate is a major criterion for grading in the WHO system, and a tumor with fewer than 4 mitoses/10 HPF fits comfortably within grade I. Ki-67 index is not utilized as a grading criterion by the WHO, but occasional studies in the literature indicate that a Ki-67 index of less than 3% also fits comfortably within the grade I category.    All controls stain appropriately.    Microscopic Description       Microscopic examination performed, substantiating the above diagnosis.    Clinical Information       Pre-op Diagnosis:  Meningioma [D32.9]  Time Placed in Formalin:  NA    Gross Description       A) The specimen is received fresh for frozen section evaluation, labeled with the patient's name and \"meningioma,\" and consists of a disc of very soft tan to red tissue 3.3 x 2.5 x 0.6 cm in " "greatest dimensions. At one edge is a smooth and glistening border consistent with membrane. Touch preparations are made from fresh tissue, and a central section of the lesion is submitted for frozen section analysis.    FROZEN SECTION DIAGNOSIS:  A) MENINGIOMA - JPL, REPORTED VERBALLY TO DR GARSIA @ 5868 ON 12/4/17.    The frozen section residue is submitted in cassette 1. Additional sections are submitted in cassettes 2 and 3.    B) The specimen is received in formalin, labeled with the patient's name and \"meningioma,\" and consists of a round mass of softly rubbery red and tan tissue measuring 5 x 4 x 3 cm, and weighing 38.1 grams. One side of this mass is covered by a tightly applied sheet of stiff light gray membrane, consistent with dura, measuring 4.2 x 3.2 cm. On sectioning, the mass is  found to have a featureless, fleshy pink cut surface, stippled with small areas of hemorrhage. Five representative sections are submitted. [facility]    Special Stains       Note - Ki67 Immunoperoxidase Stain:  This stain was done using the following criteria:    Specimen fixative: Formalin-fixed paraffin-embedded sections    Detection system: Biotin-free multimer-based technology detection system    Clone:  30-9 (Killian)    Scoring method: % of cells stained    Charges CPT: 88307 x2, 65942, 67361  ICD-10: D32.9     Result Flag  Normal       Med list and active problem list reviewed and updated as part of this encounter    Current Outpatient Prescriptions on File Prior to Visit   Medication Sig Dispense Refill     acetaminophen (TYLENOL) 325 MG tablet Take 1-2 tablets by mouth every 6 hours, as needed for pain. 100 tablet 0     blood glucose test (CONTOUR NEXT STRIPS) strips Use 1 each As Directed as needed. Dispense brand per patient's insurance at pharmacy discretion. 100 strip 5     blood-glucose meter (CONTOUR NEXT EZ METER) Misc Use to check sugars 1 each 0     fluocinonide (LIDEX) 0.05 % ointment Apply topically 2 (two) " times a day. (Patient taking differently: Apply topically 2 (two) times a day as needed (apply to feet). Apply topically 2 (two) times a day.) 30 g 11     generic lancets (MICROLET LANCET) Dispense brand per patient's insurance at pharmacy discretion. 100 each 5     levETIRAcetam (KEPPRA) 500 MG tablet Take 1 tablet (500 mg total) by mouth 2 (two) times a day. 10 tablet 0     levothyroxine (SYNTHROID, LEVOTHROID) 112 MCG tablet Take 1 tablet (112 mcg total) by mouth Daily at 6:00 am. 90 tablet 4     Current Facility-Administered Medications on File Prior to Visit   Medication Dose Route Frequency Provider Last Rate Last Dose     medroxyPROGESTERone injection 150 mg (DEPO-PROVERA)  150 mg Intramuscular Q3 Months Lucille Flannery PA-C   150 mg at 10/12/17 1441         Liliana Herrera MD

## 2021-06-14 NOTE — PROGRESS NOTES
Pt is here for staple removal s/p LEFT CRANIOTOMY FOR RESECTION OF INTRACRANIAL EXTRA AXIAL TUMOR on 12-4-17 by Dr. Gutiérrez and Dr. Abdalla.   Final path= MENINGIOMA, MIXED FIBROUS AND MENINGOTHELIAL TYPES (W.H.O. GRADE I)  Before surgery, she had expressive aphasia and right sided weakness.   Today, she is bright and alert and smiling.  She states she has no trouble with words or weakness.  She is anxious to get back to work.   She had right ear drainage and was started on antibiotic.  She said it is still draining a little but mostly stopped.  She saw her PCP today who told her that she has an ear infection.     Surgical wound WNL - CDI, no signs of infection or skin breakdown.  Incision well-healed: good skin approximation, no redness or visible/palpable edema, no tenderness to palpation.  PT. AF, denies fever, chills or sweats.  Pt. reports that the symptoms are improved from pre-op.    Staples - intact removed without difficulty. Wound prepped with Betadine before and after removal.  Surrounding skin has no signs of breakdown.  Verbal instructions regarding incision care are given.  Pt. advised to call us if any s/s of infection noted - all discussed in detail.      Erica Hi RN

## 2021-06-14 NOTE — ANESTHESIA PREPROCEDURE EVALUATION
Anesthesia Evaluation      Patient summary reviewed   No history of anesthetic complications     Airway   Mallampati: III  Neck ROM: full   Pulmonary - negative ROS and normal exam    breath sounds clear to auscultation                         Cardiovascular - negative ROS and normal exam  Exercise tolerance: > or = 4 METS  (-) murmur  ECG reviewed  Rhythm: regular  Rate: normal,    no murmur      Neuro/Psych    (+) CVA ,     Comments: Meningioma  Right sided weakness      Endo/Other    (+) diabetes mellitus (Diet controlled) type 2, hypothyroidism,      GI/Hepatic/Renal - negative ROS      Other findings: 12/01/17 1357 MR Brain With Without Contrast View Image  Impression:   CONCLUSION:  1. Obtained for treatment planning purposes. Large left frontal extra-axial lesion has mildly decreased in size compared to the prior examination. Intratumoral hemorrhage is again noted also and slightly decreased. Mass effect and left to right midline   shift has slightly decreased.    2. Small enhancing extra-axial lesion along the superior margin of the left anterior clinoid process likely an additional tiny meningioma unchanged in size.    3. A few small scattered punctate acute to early subacute infarcts in the right cerebral hemisphere may be on the basis of recent angiographic procedure though follow-up is suggested.             Dental - normal exam                        Anesthesia Plan  Planned anesthetic: general endotracheal  Decadron 10 mg IV  Zofran  Precedex    Intraoperative BS checks  ASA 3   Induction: intravenous   Anesthetic plan and risks discussed with: patient (Sister)  Anesthesia plan special considerations: antiemetics, arterial catheterization, IV therapy two IVs, dexmedetomidine  Post-op plan: routine recovery

## 2021-06-14 NOTE — PROGRESS NOTES
Preop Assessment: Queta Villarreal presents for pre-op review.  Surgeon: DR. GARSIA, DR. PAULINO, DR. NEWTON  Name of Surgery: LEFT CRANIOTOMY FOR RESECTION OF INTRACRANIAL EXTRAXIAL TUMOR   Diagnosis: MENINGIOMA  Date of Surgery: 17   Time of Surgery: 730 (PRE OP MRI AT 0600)  Hospital: [facility]  H&P: 17 WHILE INPT  History of ASA, NSAIDS, vitamin and/or herbal supplements within 10 days: No  History of blood thinners: No  History of anti-seizure med's: Yes, ON KEPPRA  Review of systems: FEELS WELL TODAY.        Diagnostics:   Labs: 17 AND TODAY: UA POSITIVE/UC PENDING: DR. NEWTON NOTIFIED AND CASE WILL BE CANCELLED FOR TOMORROW.    PT WILL START ANTIBIOTIC AFTER CULTURE/SENSITIVITIES AND BE RESCHEDULED FOR NEXT WEEK.   CXR: 17  EK17  Films: MRI HEAD 17 IN PACS.  IR ANGIO/EMBO 17 IN PACS    All questions answered regarding surgery and expected pre and postoperative course including rehabilitation phase. Patient confirmed they have help/assistance in place at home upon discharge    Reviewed with patient: Arrive 2.5 hours prior to scheduled surgery, nothing to eat or drink after midnight the night before surgery and bring all pertinent films to the hospital the day of surgery.  Continue to refrain from NSAIDS (Ibuprofen, Aleve, Naprosyn) ASA or over the counter herbal medication or supplements, anticoagulants and blood thinners.    Preop skin preparations and instructions provided.    Erica Hi RN

## 2021-06-15 NOTE — PROGRESS NOTES
CHART NOTE     DATE OF SERVICE:  2018     : 1970   47 y.o.     ASSESSMENT :   Doing well with improvement in symptoms and function.       PLAN:  Loosen restrictions. MRI 3 months post op,  MRI 6 months later, yearly thereafter.RTC prn. Enc to call with any new questions or concerns.     HPI:  Queta Villarreal is status post Gross Total Resection of left frontal brain tumor on 17 by Dr. Gutiérrez and Dr. Abdalla.  Final path: grade 1 Meningioma. She initially presented with expressive aphasia and right sided weakness. Case reviewed at Tumor conf on 2017.  Tumor has some features of a higher grade, though not enough to increase to Grade 2 and bears close surveillance.  MRI two weeks after staples out, then at 3 months postop and again 6 months later, then yearly thereafter.     TODAY, Queta Villarreal reports that her speech is much better now. Her short term memory is back.  Feels that she is close to 100%.  No HA, no N/V, no dizziness. Has been back to work at her job [employer] for two weeks now and is doing well. Working 6 hours per day ready to return to full time.      PAST MEDICAL HISTORY, SURGICAL HISTORY, REVIEW OF SYMPTOMS, MEDICATIONS AND ALLERGIES:  Past medical history, surgical history, ROS, medications and allergies reviewed with patient and remain unchanged from previous visit.    Past Medical History:   Diagnosis Date     Brain tumor     Left frontal lobe hemorrhagic mass     Difficulty with speech      Gestational diabetes      Hypothyroidism      Right sided weakness      Seizure 2017     Stroke      Type 2 diabetes mellitus     Diet controlled       PHYSICAL EXAM:    /83  Pulse (!) 116  Temp 98.3  F (36.8  C)  Resp 16  SpO2 98%    Neurological exam reveals:  Respirations easy, non-labored.   Skin: W/D/I. No rashes, lesions or breaks in integrity.   Recent and remote memory intact, fund of knowledge wnl.    Alert and oriented x3, speech fluent and appropriate.    Comprehension intact with 2 step crossover command.   Finger nose finger smooth and accurate  PERRL, EOMI, No nystagmus,   Face symmetric, tongue midline, Uvula midline,  palate rises with phonation   Shoulder shrug equal  LOWERY w/ good strength  No extremity edema noted.   Muscle Bulk and tone wnl.   Reflexes: No pathological reflexes   Gait and station:Normal  Incision: CDI without erythema or edema     RADIOGRAPHIC IMAGING: MRI 12/18/2017:    Films personally reviewed and interpreted.  Also reviewed and discussed with Dr. Gutiérrez.   Reviewed imaging with patient and family.

## 2021-06-16 PROBLEM — D49.9 NEOPLASM CAUSING MASS EFFECT ON ADJACENT STRUCTURES: Status: ACTIVE | Noted: 2017-11-25

## 2021-06-16 PROBLEM — G93.5 BRAIN COMPRESSION (H): Status: ACTIVE | Noted: 2017-12-05

## 2021-06-17 NOTE — PROGRESS NOTES
Patient came in for a depo provera injection.  Patient tolerated well.    Jennifer Laird, [facility]

## 2021-06-19 NOTE — LETTER
Letter by Liliana Herrera MD at      Author: Liliana Herrera MD Service: -- Author Type: --    Filed:  Encounter Date: 8/27/2019 Status: (Other)         Queta Villarreal  [address]  [place] [address]      August 27, 2019      Dear Queta,    As a valued [facility] patient, your healthcare needs are our priority.  Your health care team has determined that you are due for an appointment regarding your medication check.    To help prevent delays in your care, please call the [facility] at [phone].    We look forward to partnering with you to achieve optimal health and wellbeing.    Sincerely,  Your care team at [facility]

## 2021-07-03 NOTE — ADDENDUM NOTE
Addendum Note by Erica Graves RN at 11/29/2017  2:05 PM     Author: Erica Graves RN Service: -- Author Type: Registered Nurse    Filed: 11/29/2017  2:05 PM Encounter Date: 11/29/2017 Status: Signed    : Erica Graves RN (Registered Nurse)    Addended by: ERICA GRAVES on: 11/29/2017 02:05 PM        Modules accepted: Orders

## 2021-07-06 ENCOUNTER — COMMUNICATION - HEALTHEAST (OUTPATIENT)
Dept: FAMILY MEDICINE | Facility: CLINIC | Age: 51
End: 2021-07-06

## 2021-07-06 DIAGNOSIS — E03.9 HYPOTHYROIDISM: ICD-10-CM

## 2021-07-06 RX ORDER — LEVOTHYROXINE SODIUM 112 UG/1
TABLET ORAL
Qty: 30 TABLET | Refills: 0 | Status: SHIPPED | OUTPATIENT
Start: 2021-07-06 | End: 2021-11-09

## 2021-07-06 NOTE — TELEPHONE ENCOUNTER
Telephone Encounter by Tex Veronica RN at 7/6/2021  2:23 PM     Author: Tex Veronica RN Service: -- Author Type: Registered Nurse    Filed: 7/6/2021  2:24 PM Encounter Date: 7/6/2021 Status: Signed    : Tex Veronica RN (Registered Nurse)       RN cannot approve Refill Request    RN can NOT refill this medication Protocol failed and NO refill given. Last office visit: 12/18/2017 Liliana Herrera MD Last Physical: 10/12/2017 Last MTM visit: Visit date not found Last visit same specialty: 12/18/2017 Liliana Herrera MD.  Next visit within 3 mo: Visit date not found  Next physical within 3 mo: Visit date not found      Amber Ramirez Connection Triage/Med Refill 7/6/2021    Requested Prescriptions   Pending Prescriptions Disp Refills   ? levothyroxine (SYNTHROID, LEVOTHROID) 112 MCG tablet [Pharmacy Med Name: LEVOTHYROXINE SODIUM 112MCG TABS] 90 tablet 1     Sig: TAKE ONE TABLET BY MOUTH ONCE DAILY       Thyroid Hormones Protocol Failed - 7/6/2021 11:06 AM        Failed - Provider visit in past 12 months or next 3 months     Last office visit with prescriber/PCP: 12/18/2017 Liliana Herrera MD OR same dept: Visit date not found OR same specialty: 12/18/2017 Liliana Herrera MD  Last physical: 10/12/2017 Last MTM visit: Visit date not found   Next visit within 3 mo: Visit date not found  Next physical within 3 mo: Visit date not found  Prescriber OR PCP: Liliana Herrera MD  Last diagnosis associated with med order: 1. Hypothyroidism  - levothyroxine (SYNTHROID, LEVOTHROID) 112 MCG tablet [Pharmacy Med Name: LEVOTHYROXINE SODIUM 112MCG TABS]; TAKE ONE TABLET BY MOUTH ONCE DAILY  Dispense: 90 tablet; Refill: 1    If protocol passes may refill for 12 months if within 3 months of last provider visit (or a total of 15 months).             Failed - TSH on file in past 12 months for patient age 12 & older     TSH   Date Value Ref Range Status   10/17/2019 91.11 (H) 0.30 - 5.00 uIU/mL Final

## 2021-07-06 NOTE — TELEPHONE ENCOUNTER
Telephone Encounter by Liliana Herrera MD at 7/6/2021  2:31 PM     Author: Liliana Herrera MD Service: -- Author Type: Physician    Filed: 7/6/2021  2:31 PM Encounter Date: 7/6/2021 Status: Signed    : Liliana Herrera MD (Physician)       ntbs- no more refills.

## 2021-07-06 NOTE — TELEPHONE ENCOUNTER
Telephone Encounter by Radha Amezcua at 7/6/2021  3:31 PM     Author: Radha Amezcua Service: -- Author Type: Patient Access    Filed: 7/6/2021  3:31 PM Encounter Date: 7/6/2021 Status: Signed    : Radha Amezcua (Patient Access)       Patient declined to schedule an appointment. Completing task.

## 2021-07-16 DIAGNOSIS — L20.82 FLEXURAL ECZEMA: ICD-10-CM

## 2021-07-16 RX ORDER — FLUOCINONIDE 0.5 MG/G
OINTMENT TOPICAL
Qty: 30 G | Refills: 0 | Status: SHIPPED | OUTPATIENT
Start: 2021-07-16 | End: 2021-11-09

## 2021-07-16 NOTE — TELEPHONE ENCOUNTER
Pending Prescriptions:                       Disp   Refills    fluocinonide (LIDEX) 0.05 % external oint*30 g   0            Sig: [FLUOCINONIDE (LIDEX) 0.05 % OINTMENT] APPLY           TOPICALLY TWO TIMES A DAY , TAPER USE BY 2 WEEKS    No office visit for greater than one year, willing to refill?

## 2021-08-04 ENCOUNTER — TELEPHONE (OUTPATIENT)
Dept: FAMILY MEDICINE | Facility: CLINIC | Age: 51
End: 2021-08-04

## 2021-08-04 NOTE — TELEPHONE ENCOUNTER
Ms. Villarreal has scheduled her labs to check her TSH level.  Please enter order.    Thank you,   Shantel Acharya on 8/4/2021 at 9:04 AM

## 2021-08-05 NOTE — TELEPHONE ENCOUNTER
I haven't seen this patient in several years.      Will need at least a virtual visit before she can have labs drawn or refills.

## 2021-08-08 ENCOUNTER — TELEPHONE (OUTPATIENT)
Dept: LAB | Facility: CLINIC | Age: 51
End: 2021-08-08

## 2021-08-22 ENCOUNTER — HEALTH MAINTENANCE LETTER (OUTPATIENT)
Age: 51
End: 2021-08-22

## 2021-10-16 ENCOUNTER — HEALTH MAINTENANCE LETTER (OUTPATIENT)
Age: 51
End: 2021-10-16

## 2021-11-09 ENCOUNTER — E-VISIT (OUTPATIENT)
Dept: FAMILY MEDICINE | Facility: CLINIC | Age: 51
End: 2021-11-09

## 2021-11-09 ENCOUNTER — E-VISIT (OUTPATIENT)
Dept: OBGYN | Facility: CLINIC | Age: 51
End: 2021-11-09
Payer: COMMERCIAL

## 2021-11-09 ENCOUNTER — DOCUMENTATION ONLY (OUTPATIENT)
Dept: LAB | Facility: CLINIC | Age: 51
End: 2021-11-09

## 2021-11-09 DIAGNOSIS — D49.9 NEOPLASM CAUSING MASS EFFECT ON ADJACENT STRUCTURES: Primary | ICD-10-CM

## 2021-11-09 DIAGNOSIS — E11.9 TYPE 2 DIABETES MELLITUS WITHOUT COMPLICATION, WITHOUT LONG-TERM CURRENT USE OF INSULIN (H): Primary | ICD-10-CM

## 2021-11-09 DIAGNOSIS — T38.2X5A ANTITHYROID AGENTS CAUSING ADVERSE EFFECT IN THERAPEUTIC USE: ICD-10-CM

## 2021-11-09 DIAGNOSIS — Z30.42 ON DEPO-PROVERA FOR CONTRACEPTION: ICD-10-CM

## 2021-11-09 DIAGNOSIS — E03.9 ACQUIRED HYPOTHYROIDISM: Primary | ICD-10-CM

## 2021-11-09 PROCEDURE — 99207 PR NON-BILLABLE SERV PER CHARTING: CPT | Performed by: FAMILY MEDICINE

## 2021-11-09 RX ORDER — MEDROXYPROGESTERONE ACETATE 150 MG/ML
150 INJECTION, SUSPENSION INTRAMUSCULAR
COMMUNITY
Start: 2020-01-15 | End: 2021-12-13

## 2021-11-09 NOTE — PATIENT INSTRUCTIONS
Thank you for choosing us for your care. I think an in-clinic visit would be best next steps based on your symptoms. Please schedule a clinic appointment; you won t be charged for this eVisit.      You can schedule an appointment right here in [facility], or call [phone]

## 2021-11-09 NOTE — PATIENT INSTRUCTIONS
Thank you for choosing us for your care. I think an in-clinic visit would be best next steps based on your symptoms. Please schedule a clinic appointment; you won t be charged for this eVisit.      You can schedule an appointment right here in [facility], or call [phone]

## 2021-11-09 NOTE — PROGRESS NOTES
Queta ISAIAH Moralesen has an upcoming lab appointment:    Future Appointments   Date Time Provider Department Center   11/11/2021  4:30 PM [facility]   11/24/2021  7:00 AM [facility]     Patient is scheduled for the following lab(s): Labs to refill thyroid medication according to the schedule.      There is no order available. Please review and place either future orders or HMPO (Review of Health Maintenance Protocol Orders), as appropriate.    Health Maintenance Due   Topic     BMP      LIPID      MICROALBUMIN      ANNUAL REVIEW OF HM ORDERS      HIV SCREENING      HEPATITIS C SCREENING      A1C      Taylor Santos

## 2021-11-11 ENCOUNTER — MYC MEDICAL ADVICE (OUTPATIENT)
Dept: FAMILY MEDICINE | Facility: CLINIC | Age: 51
End: 2021-11-11
Payer: COMMERCIAL

## 2021-11-30 ENCOUNTER — TELEPHONE (OUTPATIENT)
Dept: NEUROSURGERY | Facility: CLINIC | Age: 51
End: 2021-11-30
Payer: COMMERCIAL

## 2021-11-30 DIAGNOSIS — D32.9 MENINGIOMA (H): Primary | ICD-10-CM

## 2021-11-30 NOTE — TELEPHONE ENCOUNTER
Queta Villarreal is status post Gross Total Resection of left frontal brain tumor on 12-4-17 by Dr. Gutiérrez and Dr. Laird. Final path: grade 1 Meningioma.  Last seen on 01/16/2018, MRI was stable.  Today Queta reports no new symptoms. She will get an updated MRI with [facility] - recommendations with follow.  Dina Romero, RN, CNRN

## 2021-11-30 NOTE — TELEPHONE ENCOUNTER
Patient has craniotomy with Dr. Gutiérrez 12.4.17. We haven't seen patient since 3.26.2018.  Please order any imaging and let me know if we will chart check results of if patient needs f/u with NP or MD.    She reports no new symptoms.  Best number [phone].

## 2021-12-01 ENCOUNTER — TELEPHONE (OUTPATIENT)
Dept: FAMILY MEDICINE | Facility: CLINIC | Age: 51
End: 2021-12-01
Payer: COMMERCIAL

## 2021-12-01 NOTE — TELEPHONE ENCOUNTER
Reason for Call:  Other call back    Detailed comments: patient called and is inquiring about results that she had done at [facility] in 2017 with Gabbi Calix MD.  Wants a f/u appointment and discuss.  States she is not sure whom to get in touch with.  Please contact patient.  Thank you.    Phone Number Patient can be reached at: Home number on file [phone] (home)    Best Time: any    Can we leave a detailed message on this number? YES    Call taken on 12/1/2021 at 8:31 AM by Amber Rosales

## 2021-12-06 NOTE — TELEPHONE ENCOUNTER
NO!    I  haven't seen patient in several years.     Needs in person or virtual visit before labs.    I already sent a message about this.   no

## 2021-12-08 ENCOUNTER — LAB (OUTPATIENT)
Dept: LAB | Facility: CLINIC | Age: 51
End: 2021-12-08
Payer: COMMERCIAL

## 2021-12-08 DIAGNOSIS — E11.9 TYPE 2 DIABETES MELLITUS WITHOUT COMPLICATION, WITHOUT LONG-TERM CURRENT USE OF INSULIN (H): ICD-10-CM

## 2021-12-08 DIAGNOSIS — Z30.42 ON DEPO-PROVERA FOR CONTRACEPTION: ICD-10-CM

## 2021-12-08 DIAGNOSIS — T38.2X5A ANTITHYROID AGENTS CAUSING ADVERSE EFFECT IN THERAPEUTIC USE: ICD-10-CM

## 2021-12-08 LAB
ALBUMIN SERPL-MCNC: 4.1 G/DL (ref 3.5–5)
ALP SERPL-CCNC: 58 U/L (ref 45–120)
ALT SERPL W P-5'-P-CCNC: 15 U/L (ref 0–45)
ANION GAP SERPL CALCULATED.3IONS-SCNC: 10 MMOL/L (ref 5–18)
AST SERPL W P-5'-P-CCNC: 25 U/L (ref 0–40)
BILIRUB SERPL-MCNC: 0.6 MG/DL (ref 0–1)
BUN SERPL-MCNC: 8 MG/DL (ref 8–22)
CALCIUM SERPL-MCNC: 9.1 MG/DL (ref 8.5–10.5)
CHLORIDE BLD-SCNC: 104 MMOL/L (ref 98–107)
CHOLEST SERPL-MCNC: 166 MG/DL
CO2 SERPL-SCNC: 25 MMOL/L (ref 22–31)
CREAT SERPL-MCNC: 0.74 MG/DL (ref 0.6–1.1)
CREAT UR-MCNC: 84 MG/DL
FASTING STATUS PATIENT QL REPORTED: NORMAL
GFR SERPL CREATININE-BSD FRML MDRD: >90 ML/MIN/1.73M2
GLUCOSE BLD-MCNC: 100 MG/DL (ref 70–125)
HBA1C MFR BLD: 5.5 % (ref 0–5.6)
HCG UR QL: NEGATIVE
HDLC SERPL-MCNC: 69 MG/DL
LDLC SERPL CALC-MCNC: 88 MG/DL
MICROALBUMIN UR-MCNC: 0.71 MG/DL (ref 0–1.99)
MICROALBUMIN/CREAT UR: 8.5 MG/G CR
POTASSIUM BLD-SCNC: 5 MMOL/L (ref 3.5–5)
PROT SERPL-MCNC: 8.1 G/DL (ref 6–8)
SODIUM SERPL-SCNC: 139 MMOL/L (ref 136–145)
T4 FREE SERPL-MCNC: 1 NG/DL (ref 0.7–1.8)
TRIGL SERPL-MCNC: 44 MG/DL
TSH SERPL DL<=0.005 MIU/L-ACNC: 13.34 UIU/ML (ref 0.3–5)

## 2021-12-08 PROCEDURE — 80061 LIPID PANEL: CPT

## 2021-12-08 PROCEDURE — 86803 HEPATITIS C AB TEST: CPT

## 2021-12-08 PROCEDURE — 84443 ASSAY THYROID STIM HORMONE: CPT

## 2021-12-08 PROCEDURE — 83036 HEMOGLOBIN GLYCOSYLATED A1C: CPT

## 2021-12-08 PROCEDURE — 36415 COLL VENOUS BLD VENIPUNCTURE: CPT

## 2021-12-08 PROCEDURE — 80053 COMPREHEN METABOLIC PANEL: CPT

## 2021-12-08 PROCEDURE — 82043 UR ALBUMIN QUANTITATIVE: CPT

## 2021-12-08 PROCEDURE — 84439 ASSAY OF FREE THYROXINE: CPT

## 2021-12-08 PROCEDURE — 81025 URINE PREGNANCY TEST: CPT

## 2021-12-09 LAB — HCV AB SERPL QL IA: NEGATIVE

## 2021-12-13 ENCOUNTER — VIRTUAL VISIT (OUTPATIENT)
Dept: FAMILY MEDICINE | Facility: CLINIC | Age: 51
End: 2021-12-13
Payer: COMMERCIAL

## 2021-12-13 DIAGNOSIS — Z78.9 NONIMMUNE TO HEPATITIS B VIRUS: ICD-10-CM

## 2021-12-13 DIAGNOSIS — R73.03 PREDIABETES: Primary | ICD-10-CM

## 2021-12-13 DIAGNOSIS — T38.2X5A ANTITHYROID AGENTS CAUSING ADVERSE EFFECT IN THERAPEUTIC USE: ICD-10-CM

## 2021-12-13 PROBLEM — D49.9 NEOPLASM CAUSING MASS EFFECT ON ADJACENT STRUCTURES: Status: RESOLVED | Noted: 2017-11-25 | Resolved: 2021-12-13

## 2021-12-13 PROBLEM — G93.5 BRAIN COMPRESSION (H): Status: RESOLVED | Noted: 2017-12-05 | Resolved: 2021-12-13

## 2021-12-13 PROCEDURE — 99203 OFFICE O/P NEW LOW 30 MIN: CPT | Mod: TEL | Performed by: FAMILY MEDICINE

## 2021-12-13 RX ORDER — LEVOTHYROXINE SODIUM 112 UG/1
112 TABLET ORAL DAILY
Qty: 90 TABLET | Refills: 1 | Status: SHIPPED | OUTPATIENT
Start: 2021-12-13 | End: 2022-09-12

## 2021-12-13 NOTE — PROGRESS NOTES
QUETA is a 51 year old who is being evaluated via a billable telephone visit.      What phone number would you like to be contacted at? [phone]  How would you like to obtain your AVS? Sabrina      [facility] PHONE Visit  Phone : none    Chief Complaint:  Chief Complaint   Patient presents with     Recheck Medication     follow up     thyroid       Assessment/Plan:  Hypothyroidism Post Radio-iodine Treatment  Was off meds for many months and has been back on again for 25 days with testing during that time.  Continue at current dose and reschedule CPE later this winter along with thyroid testing again once on stead dose for 6-8 weeks.    - levothyroxine (SYNTHROID/LEVOTHROID) 112 MCG tablet  Dispense: 90 tablet; Refill: 1    Prediabetes  Doing well- not on any meds.  Diabetes during treatment for her mengioma on steroids.      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       Return in about 3 months (around 3/13/2022) for Annual Wellness Exam.      HPI:   Queta Villarreal is a 51 year old female and requesting a phone visit for the following health issues refill on thyroid medicine.      Does well on her thyroid pill.  Has been taking for about 25 days now and had been off this for a while-     Stopped depo shot after 50.  Not getting any periods since then.      Mild neck pain but not too bad. 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     Has h/o mengioma- doesn't want to get any scans unless she gets symptoms.  Last imaging was 2018.      Got flu shot at work 11/2021  Decided to not get covid shot.     Has an appointment on Friday this week for her daughter to see me and will do any shots needed at that time and will schedule her AWV then.          Social History     Social History Narrative    Lives with boyfriend and daughter, 2 sons       Physical Exam:  Vitals from last visit  reviewed.   Per pt report at home:          No LMP recorded.  Wt Readings from Last 3 Encounters:   12/18/17 49.4 kg (109 lb)   12/07/17 49.9 kg (110 lb)   12/06/17 46.6 kg (102 lb 11.2 oz)         GENERAL: Patient sounds well and able to participate in history and planning without difficulty.       Phone call duration:  12 minutes    Liliana Herrera MD  [facility]    Answers for HPI/ROS submitted by the patient on 12/13/2021  Since last visit, patient describes the following symptoms:: None  How many servings of fruits and vegetables do you eat daily?: 2-3  On average, how many sweetened beverages do you drink each day (Examples: soda, juice, sweet tea, etc.  Do NOT count diet or artificially sweetened beverages)?: 0  How many minutes a day do you exercise enough to make your heart beat faster?: 20 to 29  How many days a week do you exercise enough to make your heart beat faster?: 6  How many days per week do you miss taking your medication?: 0

## 2022-04-25 ENCOUNTER — MEDICAL CORRESPONDENCE (OUTPATIENT)
Dept: NEUROSURGERY | Facility: CLINIC | Age: 52
End: 2022-04-25
Payer: COMMERCIAL

## 2022-05-26 ENCOUNTER — LAB (OUTPATIENT)
Dept: LAB | Facility: CLINIC | Age: 52
End: 2022-05-26
Payer: COMMERCIAL

## 2022-05-26 DIAGNOSIS — T38.2X5A ANTITHYROID AGENTS CAUSING ADVERSE EFFECT IN THERAPEUTIC USE: Primary | ICD-10-CM

## 2022-05-26 DIAGNOSIS — T38.2X5A ANTITHYROID AGENTS CAUSING ADVERSE EFFECT IN THERAPEUTIC USE: ICD-10-CM

## 2022-05-26 LAB
HBA1C MFR BLD: 5.7 % (ref 0–5.6)
T4 FREE SERPL-MCNC: <0.4 NG/DL (ref 0.7–1.8)
TSH SERPL DL<=0.005 MIU/L-ACNC: 89.66 UIU/ML (ref 0.3–5)

## 2022-05-26 PROCEDURE — 84443 ASSAY THYROID STIM HORMONE: CPT

## 2022-05-26 PROCEDURE — 36415 COLL VENOUS BLD VENIPUNCTURE: CPT

## 2022-05-26 PROCEDURE — 84439 ASSAY OF FREE THYROXINE: CPT

## 2022-05-26 PROCEDURE — 83036 HEMOGLOBIN GLYCOSYLATED A1C: CPT

## 2022-05-29 NOTE — RESULT ENCOUNTER NOTE
Team - please call patient with results and send result letter with this information if patient desires for their records.   Her thyroid is VERY low- I know that she feels well but she should start her thyroid pills again.   I'd like her to come back in 2-3 months for a full physical to make sure she is staying healthy.

## 2022-05-31 ENCOUNTER — TELEPHONE (OUTPATIENT)
Dept: FAMILY MEDICINE | Facility: CLINIC | Age: 52
End: 2022-05-31
Payer: COMMERCIAL

## 2022-05-31 NOTE — TELEPHONE ENCOUNTER
----- Message from Liliana Herrera MD sent at 5/29/2022  9:39 AM CDT -----  Team - please call patient with results and send result letter with this information if patient desires for their records.   Her thyroid is VERY low- I know that she feels well but she should start her thyroid pills again.   I'd like her to come back in 2-3 months for a full physical to make sure she is staying healthy.

## 2022-05-31 NOTE — TELEPHONE ENCOUNTER
Provider response below relayed to patient. Message understood. Scheduled physical in first week of september

## 2022-09-09 ENCOUNTER — OFFICE VISIT (OUTPATIENT)
Dept: FAMILY MEDICINE | Facility: CLINIC | Age: 52
End: 2022-09-09
Payer: COMMERCIAL

## 2022-09-09 VITALS
HEIGHT: 62 IN | DIASTOLIC BLOOD PRESSURE: 86 MMHG | TEMPERATURE: 98.1 F | RESPIRATION RATE: 14 BRPM | BODY MASS INDEX: 18.95 KG/M2 | WEIGHT: 103 LBS | SYSTOLIC BLOOD PRESSURE: 132 MMHG | HEART RATE: 79 BPM

## 2022-09-09 DIAGNOSIS — D32.9 MENINGIOMA (H): ICD-10-CM

## 2022-09-09 DIAGNOSIS — E11.9 TYPE 2 DIABETES MELLITUS WITHOUT COMPLICATION, WITHOUT LONG-TERM CURRENT USE OF INSULIN (H): ICD-10-CM

## 2022-09-09 DIAGNOSIS — Z78.9 VEGETARIAN DIET: ICD-10-CM

## 2022-09-09 DIAGNOSIS — R74.01 ELEVATED AST (SGOT): ICD-10-CM

## 2022-09-09 DIAGNOSIS — Z12.4 CERVICAL CANCER SCREENING: ICD-10-CM

## 2022-09-09 DIAGNOSIS — Z11.4 SCREENING FOR HIV (HUMAN IMMUNODEFICIENCY VIRUS): ICD-10-CM

## 2022-09-09 DIAGNOSIS — Z12.11 SCREEN FOR COLON CANCER: ICD-10-CM

## 2022-09-09 DIAGNOSIS — Z12.31 VISIT FOR SCREENING MAMMOGRAM: ICD-10-CM

## 2022-09-09 DIAGNOSIS — Z00.00 ROUTINE GENERAL MEDICAL EXAMINATION AT A HEALTH CARE FACILITY: ICD-10-CM

## 2022-09-09 DIAGNOSIS — T38.2X5A ANTITHYROID AGENTS CAUSING ADVERSE EFFECT IN THERAPEUTIC USE: Primary | ICD-10-CM

## 2022-09-09 DIAGNOSIS — H35.30 MACULAR DEGENERATION (SENILE) OF RETINA: ICD-10-CM

## 2022-09-09 LAB
ALBUMIN SERPL BCG-MCNC: 4.9 G/DL (ref 3.5–5.2)
ALP SERPL-CCNC: 61 U/L (ref 35–104)
ALT SERPL W P-5'-P-CCNC: 16 U/L (ref 10–35)
ANION GAP SERPL CALCULATED.3IONS-SCNC: 15 MMOL/L (ref 7–15)
AST SERPL W P-5'-P-CCNC: 48 U/L (ref 10–35)
BILIRUB SERPL-MCNC: 0.4 MG/DL
BUN SERPL-MCNC: 7.6 MG/DL (ref 6–20)
CALCIUM SERPL-MCNC: 9.2 MG/DL (ref 8.6–10)
CHLORIDE SERPL-SCNC: 100 MMOL/L (ref 98–107)
CHOLEST SERPL-MCNC: 254 MG/DL
CREAT SERPL-MCNC: 0.59 MG/DL (ref 0.51–0.95)
DEPRECATED HCO3 PLAS-SCNC: 23 MMOL/L (ref 22–29)
FSH SERPL IRP2-ACNC: 80.9 MIU/ML
GFR SERPL CREATININE-BSD FRML MDRD: >90 ML/MIN/1.73M2
GLUCOSE SERPL-MCNC: 76 MG/DL (ref 70–99)
HBA1C MFR BLD: 6 % (ref 0–5.6)
HDLC SERPL-MCNC: 83 MG/DL
HGB BLD-MCNC: 11.7 G/DL (ref 11.7–15.7)
LDLC SERPL CALC-MCNC: 145 MG/DL
NONHDLC SERPL-MCNC: 171 MG/DL
POTASSIUM SERPL-SCNC: 3.7 MMOL/L (ref 3.4–5.3)
PROT SERPL-MCNC: 8.7 G/DL (ref 6.4–8.3)
SODIUM SERPL-SCNC: 138 MMOL/L (ref 136–145)
TRIGL SERPL-MCNC: 131 MG/DL

## 2022-09-09 PROCEDURE — 80053 COMPREHEN METABOLIC PANEL: CPT | Performed by: FAMILY MEDICINE

## 2022-09-09 PROCEDURE — 85018 HEMOGLOBIN: CPT | Performed by: FAMILY MEDICINE

## 2022-09-09 PROCEDURE — 87389 HIV-1 AG W/HIV-1&-2 AB AG IA: CPT | Performed by: FAMILY MEDICINE

## 2022-09-09 PROCEDURE — 84439 ASSAY OF FREE THYROXINE: CPT | Performed by: FAMILY MEDICINE

## 2022-09-09 PROCEDURE — 83001 ASSAY OF GONADOTROPIN (FSH): CPT | Performed by: FAMILY MEDICINE

## 2022-09-09 PROCEDURE — 36415 COLL VENOUS BLD VENIPUNCTURE: CPT | Performed by: FAMILY MEDICINE

## 2022-09-09 PROCEDURE — 86705 HEP B CORE ANTIBODY IGM: CPT | Performed by: FAMILY MEDICINE

## 2022-09-09 PROCEDURE — 82306 VITAMIN D 25 HYDROXY: CPT | Performed by: FAMILY MEDICINE

## 2022-09-09 PROCEDURE — 99396 PREV VISIT EST AGE 40-64: CPT | Performed by: FAMILY MEDICINE

## 2022-09-09 PROCEDURE — 86708 HEPATITIS A ANTIBODY: CPT | Performed by: FAMILY MEDICINE

## 2022-09-09 PROCEDURE — G0145 SCR C/V CYTO,THINLAYER,RESCR: HCPCS | Performed by: FAMILY MEDICINE

## 2022-09-09 PROCEDURE — 83036 HEMOGLOBIN GLYCOSYLATED A1C: CPT | Performed by: FAMILY MEDICINE

## 2022-09-09 PROCEDURE — 84443 ASSAY THYROID STIM HORMONE: CPT | Performed by: FAMILY MEDICINE

## 2022-09-09 PROCEDURE — 82607 VITAMIN B-12: CPT | Performed by: FAMILY MEDICINE

## 2022-09-09 PROCEDURE — 80061 LIPID PANEL: CPT | Performed by: FAMILY MEDICINE

## 2022-09-09 PROCEDURE — 86704 HEP B CORE ANTIBODY TOTAL: CPT | Performed by: FAMILY MEDICINE

## 2022-09-09 PROCEDURE — 87624 HPV HI-RISK TYP POOLED RSLT: CPT | Performed by: FAMILY MEDICINE

## 2022-09-09 PROCEDURE — 86706 HEP B SURFACE ANTIBODY: CPT | Performed by: FAMILY MEDICINE

## 2022-09-09 PROCEDURE — 99213 OFFICE O/P EST LOW 20 MIN: CPT | Mod: 25 | Performed by: FAMILY MEDICINE

## 2022-09-09 ASSESSMENT — ENCOUNTER SYMPTOMS
JOINT SWELLING: 0
ARTHRALGIAS: 0
NERVOUS/ANXIOUS: 0
HEADACHES: 0
HEARTBURN: 0
PALPITATIONS: 0
NAUSEA: 0
HEMATURIA: 0
CONSTIPATION: 0
DIZZINESS: 0
CHILLS: 0
PARESTHESIAS: 0
BREAST MASS: 0
SHORTNESS OF BREATH: 0
SORE THROAT: 0
MYALGIAS: 0
COUGH: 0
HEMATOCHEZIA: 0
EYE PAIN: 0
DYSURIA: 0
ABDOMINAL PAIN: 0
WEAKNESS: 0
FEVER: 0
FREQUENCY: 0
DIARRHEA: 0

## 2022-09-09 NOTE — LETTER
October 6, 2022      ISAIAS D Cherelle  [address]  [place] [address]        Dear ,    We are writing to inform you of your test results.    Your thyroid is still very low- be sure you start your pills and we recheck again in 6-8 weeks.     Your diabetes is doing well- no need for meds.     Your liver test is a little high- be sure you are eating healthy and taking your thyroid pill.      Your cholesterol is high but you are still low risk for having problems- taking your thyroid pill will help.     Resulted Orders   HIV Antigen Antibody Combo   Result Value Ref Range    HIV Antigen Antibody Combo Nonreactive Nonreactive      Comment:      HIV-1 p24 Ag & HIV-1/HIV-2 Ab Not Detected   TSH with free T4 reflex   Result Value Ref Range    .00 (H) 0.30 - 4.20 uIU/mL   Hemoglobin A1c   Result Value Ref Range    Hemoglobin A1C 6.0 (H) 0.0 - 5.6 %      Comment:      Normal <5.7%   Prediabetes 5.7-6.4%    Diabetes 6.5% or higher     Note: Adopted from ADA consensus guidelines.   Comprehensive metabolic panel (BMP + Alb, Alk Phos, ALT, AST, Total. Bili, TP)   Result Value Ref Range    Sodium 138 136 - 145 mmol/L    Potassium 3.7 3.4 - 5.3 mmol/L    Creatinine 0.59 0.51 - 0.95 mg/dL    Urea Nitrogen 7.6 6.0 - 20.0 mg/dL    Chloride 100 98 - 107 mmol/L    Carbon Dioxide (CO2) 23 22 - 29 mmol/L    Anion Gap 15 7 - 15 mmol/L    Glucose 76 70 - 99 mg/dL    Calcium 9.2 8.6 - 10.0 mg/dL    Protein Total 8.7 (H) 6.4 - 8.3 g/dL    Albumin 4.9 3.5 - 5.2 g/dL    Bilirubin Total 0.4 <=1.2 mg/dL    Alkaline Phosphatase 61 35 - 104 U/L    AST 48 (H) 10 - 35 U/L    ALT 16 10 - 35 U/L    GFR Estimate >90 >60 mL/min/1.73m2      Comment:      Effective December 21, 2021 eGFRcr in adults is calculated using the 2021 CKD-EPI creatinine equation which includes age and gender (Emelia et al., NEJ, DOI: 10.1056/ZYUJvp5346264)   Vitamin D Deficiency   Result Value Ref Range    Vitamin D, Total (25-Hydroxy) 30 20 - 75 ug/L     Narrative    Season, race, dietary intake, and treatment affect the concentration of 25-hydroxy-Vitamin D. Values may decrease during winter months and increase during summer months. Values 20-29 ug/L may indicate Vitamin D insufficiency and values <20 ug/L may indicate Vitamin D deficiency.    Vitamin D determination is routinely performed by an immunoassay specific for 25 hydroxyvitamin D3.  If an individual is on vitamin D2(ergocalciferol) supplementation, please specify 25 OH vitamin D2 and D3 level determination by LCMSMS test VITD23.     Hemoglobin   Result Value Ref Range    Hemoglobin 11.7 11.7 - 15.7 g/dL   Follicle stimulating hormone   Result Value Ref Range    FSH 80.9 mIU/mL      Comment:      19 years and older:   Follicular phase: 3.5-12.5 mIU/mL   Ovulation phase: 4.7-21.5 mIU/mL   Luteal phase: 1.7-7.7 mIU/mL   Postmenopause: 25.8-134.8 mIU/mL      Lipid Profile (Chol, Trig, HDL, LDL calc)   Result Value Ref Range    Cholesterol 254 (H) <200 mg/dL    Triglycerides 131 <150 mg/dL    Direct Measure HDL 83 >=50 mg/dL    LDL Cholesterol Calculated 145 (H) <=100 mg/dL    Non HDL Cholesterol 171 (H) <130 mg/dL    Narrative    Cholesterol  Desirable:  <200 mg/dL    Triglycerides  Normal:  Less than 150 mg/dL  Borderline High:  150-199 mg/dL  High:  200-499 mg/dL  Very High:  Greater than or equal to 500 mg/dL    Direct Measure HDL  Female:  Greater than or equal to 50 mg/dL   Male:  Greater than or equal to 40 mg/dL    LDL Cholesterol  Desirable:  <100mg/dL  Above Desirable:  100-129 mg/dL   Borderline High:  130-159 mg/dL   High:  160-189 mg/dL   Very High:  >= 190 mg/dL    Non HDL Cholesterol  Desirable:  130 mg/dL  Above Desirable:  130-159 mg/dL  Borderline High:  160-189 mg/dL  High:  190-219 mg/dL  Very High:  Greater than or equal to 220 mg/dL   Hepatitis B core antibody   Result Value Ref Range    Hepatitis B Core Antibody Total Reactive (A) Nonreactive      Comment:      A reactive result  indicates acute, chronic or past/resolved hepatitis B infection.   Hepatitis Antibody A IgG   Result Value Ref Range    Hepatitis A Antibody IgG Reactive (A) Nonreactive    Narrative    This assay cannot be used for the diagnosis of acute HAV infection.   Vitamin B12   Result Value Ref Range    Vitamin B12 752 232 - 1,245 pg/mL   T4 free   Result Value Ref Range    Free T4 0.18 (L) 0.90 - 1.70 ng/dL   Hepatitis B core antibody IgM   Result Value Ref Range    Hepatitis B Core Antibody IgM Nonreactive Nonreactive      Comment:      A nonreactive result suggests lack of recent exposure to the virus in the preceding 6 months.   Hepatitis B Surface Antibody   Result Value Ref Range    Hepatitis B Surface Antibody Instrument Value 0.00 <8.00 m[IU]/mL    Hepatitis B Surface Antibody Nonreactive       Comment:      No antibody detected when the value is less than 8.00 mIU/mL.       If you have any questions or concerns, please call the clinic at the number listed above.       Sincerely,      Liliana Herrera MD

## 2022-09-09 NOTE — PROGRESS NOTES
SUBJECTIVE:   CC: Queta Villarreal is an 52 year old woman who presents for preventive health visit.   [facility] Health Maintenance Exam  [facility]  Phone : none    Assessment/Plan     1. Annual Wellness Visit as outlined below.   Health maintenance discussed as appropriate for age and risk factors including:  Nutrition, exercise, alcohol use, tobacco use, safe sexual practices, dental health.  Cancer screening assessed and ordered as indicated. Routine labs as outlined below based on age and risk factors reviewed today. Immunizations reviewed and updated.       Type 2 diabetes mellitus without complication, without long-term current use of insulin (H)   Controlled.  Addressed smoking status and aspirin therapy.  Recommended annual eye exam and dental cares. Reviewed foot cares and foot exam.  Blood pressure and lipid management reviewed today.  Vaccines reviewed and updated.  Plan for glucose management includes ongoing focus on healthy diabetic diet and increased activity, continue to monitor every 6 months and no need for meds at this time.  Work on thyroid management.  Labs ordered as below including:     Hemoglobin A1C   Date Value Ref Range Status   09/09/2022 6.0 (H) 0.0 - 5.6 % Final     Comment:     Normal <5.7%   Prediabetes 5.7-6.4%    Diabetes 6.5% or higher     Note: Adopted from ADA consensus guidelines.   05/26/2022 5.7 (H) 0.0 - 5.6 % Final     Comment:     Normal <5.7%   Prediabetes 5.7-6.4%    Diabetes 6.5% or higher     Note: Adopted from ADA consensus guidelines.   12/08/2021 5.5 0.0 - 5.6 % Final     Comment:     Normal <5.7%   Prediabetes 5.7-6.4%    Diabetes 6.5% or higher     Note: Adopted from ADA consensus guidelines.    ,   Lab Results   Component Value Date    LDL 88 12/08/2021   ,   Creatinine   Date Value Ref Range Status   12/08/2021 0.74 0.60 - 1.10 mg/dL Final        - Adult Eye  Referral  - Hemoglobin A1c  - Lipid Profile (Chol, Trig, HDL, LDL  calc)  - Hemoglobin A1c  - Lipid Profile (Chol, Trig, HDL, LDL calc)    Visit for screening mammogram  - MA SCREENING DIGITAL BILAT - Future  (s+30)    Screen for colon cancer  - COLOGUARD(EXACT SCIENCES)    Screening for HIV (human immunodeficiency virus)  - HIV Antigen Antibody Combo  - HIV Antigen Antibody Combo    Cervical cancer screening  - Pap Screen with HPV - recommended age 30 - 65 years    Hypothyroidism Post Radio-iodine Treatment  Pt keeps forgetting to take the meds and then when she does complete a bottle she forgets to get refills.  Pt noting cold intolerance, memory problems, and seems to have some facial edema today. bp mildly elevated today.    - TSH with free T4 reflex  - TSH with free T4 reflex    Routine general medical examination at a health care facility  - Comprehensive metabolic panel (BMP + Alb, Alk Phos, ALT, AST, Total. Bili, TP)  - Vitamin D Deficiency  - Lipid Profile (Chol, Trig, HDL, LDL calc)  - HIV Antigen Antibody Combo  - Comprehensive metabolic panel (BMP + Alb, Alk Phos, ALT, AST, Total. Bili, TP)  - Vitamin D Deficiency  - HEPATITIS B VACCINE, ADULT, IM  - Hemoglobin  - Follicle stimulating hormone  - Hepatitis B core antibody  - Hepatitis Antibody A IgG  - Vitamin B12    Meningioma (H)  Pt declines further evaluation- she notes that she isn't having any symptoms, doesn't really want to know if it is getting bigger because she wouldn't do any treatment at this time.     Vegetarian diet  Check labs     Macular degeneration (senile) of retina  Per pt report with basic optometry appointment last year - requests help getting in to see an eye doctor.   - Adult Eye  Referral       Return in about 6 months (around 3/9/2023) for Office Visit.    HPI:     Chief Complaint   Patient presents with     Physical       Queta COLON Cherelle is a 52 year old female and is here for a comprehensive health maintenance exam.     Other concerns today:   Thyroid- hasn't been taking her  replacement. Always very cold.  Constipation- better with tumeric and pepper.  Poor memory.  No period since 51yo     Meningioma isn't bothering her and she doesn't want to know about what they are doing or how big they are getting, etc.      Doesn't want to do covid shots    Told she had hep b in the past   Old Records-1: Outside allergies, meds, problems and immunizations were reconciled as needed from [facility]  Radiology tests reviewed-1: MRI brain 2018 stable   Lab tests reviewed-1: 2022    Review of Systems   Complete ROS including General, HEENT, CV, Pulmonary, GI, , Genital, Neuro, Psych, MSK, Heme, Endocrine all within normal except as noted in the HPI.      Prevention of Osteoporosis:vitamin D and B supplement   Last Dexa:start at 65    Cancer Screening:  Hemoccults/Colonoscopy: willing to do cologard  Mammogram:  Due- ordered   Pap Smear:  Due today   Lung Cancer: na      Wt Readings from Last 3 Encounters:   09/09/22 46.7 kg (103 lb)   12/18/17 49.4 kg (109 lb)   12/07/17 49.9 kg (110 lb)         Complete physical exam including:  General, HEENT, Neck, breast, back, CV, Pulmonary, Abdominal, extremities, skin, neuro, psych, lymph, genital exams all within normal.    Abnormalities include:  Pt's face seems puffy with some edema around both of her eyes compared to baseline per provider recall, hands are very dry and cold to touch, thin hair. No lower ext edema noted.        Patient has been advised of split billing requirements and indicates understanding: Yes  Healthy Habits:     Getting at least 3 servings of Calcium per day:  Yes    Bi-annual eye exam:  Yes    Dental care twice a year:  Yes    Sleep apnea or symptoms of sleep apnea:  None    Diet:  Regular (no restrictions)    Frequency of exercise:  4-5 days/week    Duration of exercise:  15-30 minutes    Taking medications regularly:  Yes    PHQ-2 Total Score: 0    Additional concerns today:  No    Today's PHQ-2 Score:   PHQ-2 ( 1999 [employer])  9/9/2022   Q1: Little interest or pleasure in doing things 0   Q2: Feeling down, depressed or hopeless 0   PHQ-2 Score 0   Q1: Little interest or pleasure in doing things Not at all   Q2: Feeling down, depressed or hopeless Not at all   PHQ-2 Score 0       Abuse: Current or Past (Physical, Sexual or Emotional) - No  Do you feel safe in your environment? Yes    Have you ever done Advance Care Planning? (For example, a Health Directive, POLST, or a discussion with a medical provider or your loved ones about your wishes): No, advance care planning information given to patient to review.  Patient declined advance care planning discussion at this time.    Social History     Tobacco Use     Smoking status: Never Smoker     Smokeless tobacco: Never Used   Substance Use Topics     Alcohol use: No     If you drink alcohol do you typically have >3 drinks per day or >7 drinks per week? No    Alcohol Use 9/9/2022   Prescreen: >3 drinks/day or >7 drinks/week? No   Prescreen: >3 drinks/day or >7 drinks/week? -   No flowsheet data found.    Reviewed orders with patient.  Reviewed health maintenance and updated orders accordingly - Yes    Breast Cancer Screening:    Breast CA Risk Assessment (FHS-7) 9/9/2022   Do you have a family history of breast, colon, or ovarian cancer? No / Unknown       Mammogram Screening: Recommended annual mammography  Pertinent mammograms are reviewed under the imaging tab.    History of abnormal Pap smear: NO - age 30-65 PAP every 5 years with negative HPV co-testing recommended  PAP / HPV 8/15/2016   PAP Negative for squamous intraepithelial lesion or malignancy  Electronically signed by Karie Myers CT (ASCP) on 8/24/2016 at  3:45 PM       Reviewed and updated as needed this visit by clinical staff   Tobacco  Allergies  Meds  Problems  Med Hx  Surg Hx  Fam Hx  Soc   Hx          Reviewed and updated as needed this visit by Provider   Tobacco  Allergies  Meds  Problems  Med Hx   "Surg Hx  Fam Hx  Soc   Hx         History reviewed. No pertinent past medical history.   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   HENT: Negative for congestion, ear pain, hearing loss and sore throat.    Eyes: Negative for pain and visual disturbance.   Respiratory: Negative for cough and shortness of breath.    Cardiovascular: Negative for chest pain, palpitations and peripheral edema.   Gastrointestinal: Negative for abdominal pain, constipation, diarrhea, heartburn, hematochezia and nausea.   Breasts:  Negative for tenderness, breast mass and discharge.   Genitourinary: Negative for dysuria, frequency, genital sores, hematuria, pelvic pain, urgency, vaginal bleeding and vaginal discharge.   Musculoskeletal: Negative for arthralgias, joint swelling and myalgias.   Skin: Negative for rash.   Neurological: Negative for dizziness, weakness, headaches and paresthesias.   Psychiatric/Behavioral: Negative for mood changes. The patient is not nervous/anxious.       OBJECTIVE:   /86   Pulse 79   Temp 98.1  F (36.7  C) (Temporal)   Resp 14   Ht 1.57 m (5' 1.81\")   Wt 46.7 kg (103 lb)   BMI 18.95 kg/m    Physical Exam    ASSESSMENT/PLAN:       COUNSELING:    Estimated body mass index is 18.95 kg/m  as calculated from the following:    Height as of this encounter: 1.57 m (5' 1.81\").    Weight as of this encounter: 46.7 kg (103 " lb).        She reports that she has never smoked. She has never used smokeless tobacco.      Counseling Resources:  ATP IV Guidelines  Pooled Cohorts Equation Calculator  Breast Cancer Risk Calculator  BRCA-Related Cancer Risk Assessment: FHS-7 Tool  FRAX Risk Assessment  ICSI Preventive Guidelines  Dietary Guidelines for Americans, 2010  USDA's MyPlate  ASA Prophylaxis  Lung CA Screening    Liliana Herrera MD  [facility]

## 2022-09-09 NOTE — Clinical Note
Pt keeps forgetting to get her thyroid refills- can you help set up mail order and automatic refills?

## 2022-09-10 LAB — VIT B12 SERPL-MCNC: 752 PG/ML (ref 232–1245)

## 2022-09-11 LAB
HIV 1+2 AB+HIV1 P24 AG SERPL QL IA: NONREACTIVE
T4 FREE SERPL-MCNC: 0.18 NG/DL (ref 0.9–1.7)
TSH SERPL DL<=0.005 MIU/L-ACNC: 144 UIU/ML (ref 0.3–4.2)

## 2022-09-12 LAB
DEPRECATED CALCIDIOL+CALCIFEROL SERPL-MC: 30 UG/L (ref 20–75)
HAV IGG SER QL IA: REACTIVE
HBV CORE AB SERPL QL IA: REACTIVE

## 2022-09-12 RX ORDER — LEVOTHYROXINE SODIUM 112 UG/1
112 TABLET ORAL DAILY
Qty: 90 TABLET | Refills: 1 | Status: SHIPPED | OUTPATIENT
Start: 2022-09-12 | End: 2023-05-09

## 2022-09-14 LAB
BKR LAB AP GYN ADEQUACY: NORMAL
BKR LAB AP GYN INTERPRETATION: NORMAL
BKR LAB AP HPV REFLEX: NORMAL
BKR LAB AP PREVIOUS ABNORMAL: NORMAL
HBV CORE IGM SERPL QL IA: NONREACTIVE
HBV SURFACE AB SERPL IA-ACNC: 0 M[IU]/ML
HBV SURFACE AB SERPL IA-ACNC: NONREACTIVE M[IU]/ML
PATH REPORT.COMMENTS IMP SPEC: NORMAL
PATH REPORT.COMMENTS IMP SPEC: NORMAL
PATH REPORT.RELEVANT HX SPEC: NORMAL

## 2022-09-16 LAB
HUMAN PAPILLOMA VIRUS 16 DNA: NEGATIVE
HUMAN PAPILLOMA VIRUS 18 DNA: NEGATIVE
HUMAN PAPILLOMA VIRUS FINAL DIAGNOSIS: NORMAL
HUMAN PAPILLOMA VIRUS OTHER HR: NEGATIVE

## 2022-10-01 ENCOUNTER — HEALTH MAINTENANCE LETTER (OUTPATIENT)
Age: 52
End: 2022-10-01

## 2022-10-06 PROBLEM — R74.01 ELEVATED AST (SGOT): Status: ACTIVE | Noted: 2022-10-06

## 2023-03-01 ENCOUNTER — TELEPHONE (OUTPATIENT)
Dept: FAMILY MEDICINE | Facility: CLINIC | Age: 53
End: 2023-03-01
Payer: COMMERCIAL

## 2023-03-01 DIAGNOSIS — T38.2X5A ANTITHYROID AGENTS CAUSING ADVERSE EFFECT IN THERAPEUTIC USE: Primary | ICD-10-CM

## 2023-03-01 DIAGNOSIS — R73.03 PREDIABETES: ICD-10-CM

## 2023-03-01 DIAGNOSIS — R74.01 ELEVATED AST (SGOT): ICD-10-CM

## 2023-03-01 NOTE — TELEPHONE ENCOUNTER
Pt was seen 9/9/22 and asked to follow-up with me in 6 months for her next visit.     Please help her schedule follow-up this spring.     I will put orders in for lab to do prior to this.    She is also due for hepatitis B shot- order placed     She should also submit her cologard test     She should also schedule her mammogram

## 2023-03-01 NOTE — TELEPHONE ENCOUNTER
Order/Referral Request    Who is requesting: Patient    Orders being requested: lab orders for A1C, Thyroid (T4 and TSH), CBC, CMP, Vit D, - the tests she had done at her physical on 9/9/22.      Reason service is needed/diagnosis: Pt states was told needed to repeat in 3 months.  Thyroid was abnormal.     When are orders needed by: ASAP    Has this been discussed with Provider: Yes    Does patient have a preference on a Group/Provider/Facility? [facility]    Does patient have an appointment scheduled?: No    Where to send orders: Place orders within Epic    Could we send this information to you in [facility] or would you prefer to receive a phone call?:   Patient would prefer a phone call     Okay to leave a detailed message?: Yes at Home number on file [phone] (home)    Call taken on 3/1/23 at 1250 pm by ADAM Walker

## 2023-03-03 NOTE — TELEPHONE ENCOUNTER
Unable to leave message x 2. Please review message thread below and advise the patient as indicated. Please schedule if necessary or indicated in message thread

## 2023-03-07 NOTE — TELEPHONE ENCOUNTER
Left detailed VM for the patient informing her to schedule lab appt and f/u appt with Dr. Herrera.     Orders have not been signed.

## 2023-05-03 ENCOUNTER — LAB (OUTPATIENT)
Dept: LAB | Facility: CLINIC | Age: 53
End: 2023-05-03
Payer: COMMERCIAL

## 2023-05-03 DIAGNOSIS — R73.03 PREDIABETES: ICD-10-CM

## 2023-05-03 DIAGNOSIS — R74.01 ELEVATED AST (SGOT): ICD-10-CM

## 2023-05-03 DIAGNOSIS — T38.2X5A ANTITHYROID AGENTS CAUSING ADVERSE EFFECT IN THERAPEUTIC USE: ICD-10-CM

## 2023-05-03 LAB
ALBUMIN SERPL BCG-MCNC: 4.5 G/DL (ref 3.5–5.2)
ALP SERPL-CCNC: 58 U/L (ref 35–104)
ALT SERPL W P-5'-P-CCNC: 11 U/L (ref 10–35)
ANION GAP SERPL CALCULATED.3IONS-SCNC: 10 MMOL/L (ref 7–15)
AST SERPL W P-5'-P-CCNC: 29 U/L (ref 10–35)
BILIRUB SERPL-MCNC: 0.4 MG/DL
BUN SERPL-MCNC: 7 MG/DL (ref 6–20)
CALCIUM SERPL-MCNC: 9.3 MG/DL (ref 8.6–10)
CHLORIDE SERPL-SCNC: 105 MMOL/L (ref 98–107)
CREAT SERPL-MCNC: 0.65 MG/DL (ref 0.51–0.95)
CREAT UR-MCNC: 146 MG/DL
DEPRECATED HCO3 PLAS-SCNC: 25 MMOL/L (ref 22–29)
GFR SERPL CREATININE-BSD FRML MDRD: >90 ML/MIN/1.73M2
GLUCOSE SERPL-MCNC: 128 MG/DL (ref 70–99)
HBA1C MFR BLD: 5.7 % (ref 0–5.6)
MICROALBUMIN UR-MCNC: <12 MG/L
MICROALBUMIN/CREAT UR: NORMAL MG/G{CREAT}
POTASSIUM SERPL-SCNC: 4.6 MMOL/L (ref 3.4–5.3)
PROT SERPL-MCNC: 8.3 G/DL (ref 6.4–8.3)
SODIUM SERPL-SCNC: 140 MMOL/L (ref 136–145)
T4 FREE SERPL-MCNC: 1.64 NG/DL (ref 0.9–1.7)
TSH SERPL DL<=0.005 MIU/L-ACNC: 8.51 UIU/ML (ref 0.3–4.2)

## 2023-05-03 PROCEDURE — 84439 ASSAY OF FREE THYROXINE: CPT

## 2023-05-03 PROCEDURE — 84443 ASSAY THYROID STIM HORMONE: CPT

## 2023-05-03 PROCEDURE — 36415 COLL VENOUS BLD VENIPUNCTURE: CPT

## 2023-05-03 PROCEDURE — 83036 HEMOGLOBIN GLYCOSYLATED A1C: CPT

## 2023-05-03 PROCEDURE — 82043 UR ALBUMIN QUANTITATIVE: CPT

## 2023-05-03 PROCEDURE — 82570 ASSAY OF URINE CREATININE: CPT

## 2023-05-03 PROCEDURE — 80053 COMPREHEN METABOLIC PANEL: CPT

## 2023-05-09 DIAGNOSIS — T38.2X5A ANTITHYROID AGENTS CAUSING ADVERSE EFFECT IN THERAPEUTIC USE: ICD-10-CM

## 2023-05-09 RX ORDER — LEVOTHYROXINE SODIUM 125 UG/1
125 TABLET ORAL DAILY
Qty: 90 TABLET | Refills: 1 | Status: SHIPPED | OUTPATIENT
Start: 2023-05-09 | End: 2024-01-01

## 2023-07-26 ENCOUNTER — TELEPHONE (OUTPATIENT)
Dept: FAMILY MEDICINE | Facility: CLINIC | Age: 53
End: 2023-07-26
Payer: COMMERCIAL

## 2023-08-01 NOTE — TELEPHONE ENCOUNTER
"Patient Returning Call  Reason for call:  Returning call  Information relayed to patient:  PCP message below.   Patient has additional questions:  yes  If YES, what are your questions/concerns:   Patient asked what is menopause. Defined it for patient per [email or web address] definition, that it is \"..when period stops,... usually occurs naturally, most often after age 45\".  Patient explained that now she understands.  Patient cannot do the lab appointment today for the thyroid level when she comes in for the depo,so patient will call back to schedule the lab visit.  Okay to leave a detailed message?: No call back needed      " warm

## 2023-08-10 ENCOUNTER — PATIENT OUTREACH (OUTPATIENT)
Dept: CARE COORDINATION | Facility: CLINIC | Age: 53
End: 2023-08-10
Payer: COMMERCIAL

## 2023-08-24 ENCOUNTER — TELEPHONE (OUTPATIENT)
Dept: FAMILY MEDICINE | Facility: CLINIC | Age: 53
End: 2023-08-24
Payer: COMMERCIAL

## 2023-08-24 ENCOUNTER — PATIENT OUTREACH (OUTPATIENT)
Dept: CARE COORDINATION | Facility: CLINIC | Age: 53
End: 2023-08-24
Payer: COMMERCIAL

## 2023-08-24 NOTE — TELEPHONE ENCOUNTER
Patient Quality Outreach    Patient is due for the following:   Diabetes -  Eye Exam and Foot Exam  Colon Cancer Screening  Depression  -  PHQ-9 needed  Physical Preventive Adult Physical      Topic Date Due    Hepatitis B Vaccine (1 of 3 - 3-dose series) Never done    COVID-19 Vaccine (1) Never done    Pneumococcal Vaccine (1 - PCV) Never done    Zoster (Shingles) Vaccine (1 of 2) Never done       Next Steps:   Schedule a Adult Preventative    Type of outreach:    Phone, left message for patient/parent to call back.      Questions for provider review:    None           URSZULA Vigil MA

## 2023-10-15 ENCOUNTER — HEALTH MAINTENANCE LETTER (OUTPATIENT)
Age: 53
End: 2023-10-15

## 2023-12-21 ASSESSMENT — ENCOUNTER SYMPTOMS
HEARTBURN: 0
DIZZINESS: 0
HEMATURIA: 0
SORE THROAT: 0
COUGH: 0
DYSURIA: 0
ARTHRALGIAS: 0
NERVOUS/ANXIOUS: 0
BREAST MASS: 0
HEADACHES: 0
EYE PAIN: 0
DIARRHEA: 0
FEVER: 0
CHILLS: 0
SHORTNESS OF BREATH: 0
JOINT SWELLING: 0
HEMATOCHEZIA: 0
PARESTHESIAS: 0
WEAKNESS: 0
ABDOMINAL PAIN: 0
CONSTIPATION: 0
FREQUENCY: 0
NAUSEA: 0
MYALGIAS: 0
PALPITATIONS: 0

## 2023-12-24 ENCOUNTER — HEALTH MAINTENANCE LETTER (OUTPATIENT)
Age: 53
End: 2023-12-24

## 2023-12-28 ENCOUNTER — OFFICE VISIT (OUTPATIENT)
Dept: FAMILY MEDICINE | Facility: CLINIC | Age: 53
End: 2023-12-28
Payer: COMMERCIAL

## 2023-12-28 VITALS
BODY MASS INDEX: 18.43 KG/M2 | TEMPERATURE: 97.5 F | HEIGHT: 63 IN | HEART RATE: 72 BPM | DIASTOLIC BLOOD PRESSURE: 60 MMHG | SYSTOLIC BLOOD PRESSURE: 120 MMHG | WEIGHT: 104 LBS

## 2023-12-28 DIAGNOSIS — T38.2X5A ANTITHYROID AGENTS CAUSING ADVERSE EFFECT IN THERAPEUTIC USE: ICD-10-CM

## 2023-12-28 DIAGNOSIS — R73.03 PREDIABETES: ICD-10-CM

## 2023-12-28 DIAGNOSIS — Z12.31 VISIT FOR SCREENING MAMMOGRAM: Primary | ICD-10-CM

## 2023-12-28 DIAGNOSIS — H35.30 MACULAR DEGENERATION (SENILE) OF RETINA: ICD-10-CM

## 2023-12-28 DIAGNOSIS — Z12.11 SCREEN FOR COLON CANCER: ICD-10-CM

## 2023-12-28 DIAGNOSIS — D32.9 MENINGIOMA (H): ICD-10-CM

## 2023-12-28 DIAGNOSIS — Z00.00 ROUTINE GENERAL MEDICAL EXAMINATION AT A HEALTH CARE FACILITY: ICD-10-CM

## 2023-12-28 DIAGNOSIS — Z78.9 NONIMMUNE TO HEPATITIS B VIRUS: ICD-10-CM

## 2023-12-28 PROBLEM — R74.01 ELEVATED AST (SGOT): Status: RESOLVED | Noted: 2022-10-06 | Resolved: 2023-12-28

## 2023-12-28 LAB
ANION GAP SERPL CALCULATED.3IONS-SCNC: 10 MMOL/L (ref 7–15)
BUN SERPL-MCNC: 6.4 MG/DL (ref 6–20)
CALCIUM SERPL-MCNC: 9.5 MG/DL (ref 8.6–10)
CHLORIDE SERPL-SCNC: 99 MMOL/L (ref 98–107)
CHOLEST SERPL-MCNC: 201 MG/DL
CREAT SERPL-MCNC: 0.51 MG/DL (ref 0.51–0.95)
DEPRECATED HCO3 PLAS-SCNC: 27 MMOL/L (ref 22–29)
EGFRCR SERPLBLD CKD-EPI 2021: >90 ML/MIN/1.73M2
FASTING STATUS PATIENT QL REPORTED: NO
GLUCOSE SERPL-MCNC: 98 MG/DL (ref 70–99)
HBA1C MFR BLD: 5.9 % (ref 0–5.6)
HDLC SERPL-MCNC: 67 MG/DL
LDLC SERPL CALC-MCNC: 119 MG/DL
NONHDLC SERPL-MCNC: 134 MG/DL
POTASSIUM SERPL-SCNC: 4.5 MMOL/L (ref 3.4–5.3)
SODIUM SERPL-SCNC: 136 MMOL/L (ref 135–145)
T4 FREE SERPL-MCNC: 1.36 NG/DL (ref 0.9–1.7)
TRIGL SERPL-MCNC: 74 MG/DL
TSH SERPL DL<=0.005 MIU/L-ACNC: 6.29 UIU/ML (ref 0.3–4.2)

## 2023-12-28 PROCEDURE — 90746 HEPB VACCINE 3 DOSE ADULT IM: CPT | Performed by: FAMILY MEDICINE

## 2023-12-28 PROCEDURE — 80048 BASIC METABOLIC PNL TOTAL CA: CPT | Performed by: FAMILY MEDICINE

## 2023-12-28 PROCEDURE — 83036 HEMOGLOBIN GLYCOSYLATED A1C: CPT | Performed by: FAMILY MEDICINE

## 2023-12-28 PROCEDURE — 84443 ASSAY THYROID STIM HORMONE: CPT | Performed by: FAMILY MEDICINE

## 2023-12-28 PROCEDURE — 84439 ASSAY OF FREE THYROXINE: CPT | Performed by: FAMILY MEDICINE

## 2023-12-28 PROCEDURE — 90471 IMMUNIZATION ADMIN: CPT | Performed by: FAMILY MEDICINE

## 2023-12-28 PROCEDURE — 36415 COLL VENOUS BLD VENIPUNCTURE: CPT | Performed by: FAMILY MEDICINE

## 2023-12-28 PROCEDURE — 99396 PREV VISIT EST AGE 40-64: CPT | Mod: 25 | Performed by: FAMILY MEDICINE

## 2023-12-28 PROCEDURE — 99213 OFFICE O/P EST LOW 20 MIN: CPT | Mod: 25 | Performed by: FAMILY MEDICINE

## 2023-12-28 PROCEDURE — 80061 LIPID PANEL: CPT | Performed by: FAMILY MEDICINE

## 2023-12-28 ASSESSMENT — ENCOUNTER SYMPTOMS
BREAST MASS: 0
EYE PAIN: 0
WEAKNESS: 0
DIZZINESS: 0
HEARTBURN: 0
ARTHRALGIAS: 0
PARESTHESIAS: 0
COUGH: 0
PALPITATIONS: 0
SORE THROAT: 0
FREQUENCY: 0
CONSTIPATION: 0
JOINT SWELLING: 0
DIARRHEA: 0
DYSURIA: 0
NERVOUS/ANXIOUS: 0
SHORTNESS OF BREATH: 0
HEMATURIA: 0
CHILLS: 0
HEMATOCHEZIA: 0
HEADACHES: 0
ABDOMINAL PAIN: 0
FEVER: 0
MYALGIAS: 0
NAUSEA: 0

## 2023-12-28 NOTE — PATIENT INSTRUCTIONS

## 2023-12-28 NOTE — PROGRESS NOTES
SUBJECTIVE:   Queta is a 53 year old, presenting for the following:  Physical  [facility] Health Maintenance Exam  [facility]  Phone : declines     Assessment/Plan     1. Preventive Health Visit as outlined below.   Health maintenance discussed as appropriate for age and risk factors including:  Nutrition, exercise, alcohol use, tobacco use, safe sexual practices, dental health.  Cancer screening assessed and ordered as indicated. Routine labs as outlined below based on age and risk factors reviewed today. Immunizations reviewed and updated.       Visit for screening mammogram    - MA SCREENING DIGITAL BILAT - Future  (s+30)    Screen for colon cancer  Pt given instructions for cologard in English to collect in 2024    Prediabetes  Reviewed LSM and continue to check labs every 6-12 months.    - HEMOGLOBIN A1C  - Basic metabolic panel  (Ca, Cl, CO2, Creat, Gluc, K, Na, BUN)  - HEMOGLOBIN A1C  - Basic metabolic panel  (Ca, Cl, CO2, Creat, Gluc, K, Na, BUN)    Meningioma (H)  Pt declines monitoring.  She is asymptomatic and doesn't want to monitor and worry about this unless it is bothering her.  Reviewed that there may be treatments short of surgery that could keep her from becoming symptomatic as symptoms may include seizures, loss of function, etc.  She understands but declines and will contact me if she wants imaging or neurology consult in the future.      Routine general medical examination at a health care facility  - Lipid panel reflex to direct LDL Non-fasting  - REVIEW OF HEALTH MAINTENANCE PROTOCOL ORDERS  - HEPATITIS B, ADULT 20+ (ENGERIX-B/RECOMBIVAX HB)  - HEPATITIS B, ADULT 20+ (ENGERIX-B/RECOMBIVAX HB)  - Lipid panel reflex to direct LDL Non-fasting    Nonimmune to hepatitis B virus  Hep B vaccines #1 today     Hypothyroidism Post Radio-iodine Treatment  Currently taking 1/2 of her 125mcg daily.  Will increase to 75mcg daily and recheck labs in 2-3 months.    - TSH with free  T4 reflex  - TSH with free T4 reflex  - T4 free  - levothyroxine (SYNTHROID/LEVOTHROID) 75 MCG tablet  Dispense: 90 tablet; Refill: 1  - TSH with free T4 reflex    Macular degeneration (senile) of retina  Reviewed that pt should be seeing ophthalmologist not optometrist annually         Follow-up Visit   Expected date:  Dec 28, 2024 (Approximate)      Follow Up Appointment Details:     Follow-up with whom?: PCP    Follow-Up for what?: Adult Preventive    How?: In Person                     HPI:     Chief Complaint   Patient presents with    Physical       Queta ISAIAH Villarreal is a 53 year old female and is here for a comprehensive health maintenance exam.     Other concerns today:   Will get a heart/chest pain that is very quick that makes it hard to breath for a moment- will stop and take a deep breath and it will pass.  Doesn't get daily or even weekly.  Sometimes will go up to a month.  Worried about a heart problem.  Worse if she is moving too fast or grabbing something.      Did go to [facility] last week.  Did not see any macular degeneratoin but needs a better machine to examine this.      Thyroid pill 125mcg just filled 12/23/23 but had been taking 1/2 pill daily prior because she thought this was too strong.      Has cologard at home but needs the instructions in [employer].     Declines covid, flu, prevnar     Knows that she has a menigioma but totally asymptomatic but doesn't want to scan or check on this unless she is bothered about.        Old Records-1: Outside allergies, meds, problems and immunizations were reconciled as needed from [facility]  Radiology tests reviewed-1: last brain mri 2018   Lab tests reviewed-1: 2023    Review of Systems   Complete ROS including General, HEENT, CV, Pulmonary, GI, , Genital, Neuro, Psych, MSK, Heme, Endocrine all within normal except as noted in the HPI or below as documented by patient.       Prevention of Osteoporosis:limited calcium in diet.    Last  Dexa:na    Cancer Screening:  Hemoccults/Colonoscopy: wants to do cologard  Mammogram:  due  Pap Smear:  done  normal   Lung Cancer: na      Wt Readings from Last 3 Encounters:   23 47.2 kg (104 lb)   22 46.7 kg (103 lb)   17 49.4 kg (109 lb)         Complete physical exam including:  General, HEENT, Neck, back, CV, Pulmonary, Abdominal, extremities, skin, neuro, psych, lymph exams all within normal.    Abnormalities include:  grossly normal exam         2023     4:34 PM   Additional Questions   Roomed by M   Accompanied by self       Healthy Habits:     Getting at least 3 servings of Calcium per day:  NO    Bi-annual eye exam:  Yes    Dental care twice a year:  Yes    Sleep apnea or symptoms of sleep apnea:  None    Diet:  Vegetarian/vegan    Frequency of exercise:  6-7 days/week    Duration of exercise:  15-30 minutes    Taking medications regularly:  Yes    Medication side effects:  None    Additional concerns today:  Yes      Today's PHQ-2 Score:       2023     4:34 PM   PHQ-2 (  [employer])   Q1: Little interest or pleasure in doing things 0   Q2: Feeling down, depressed or hopeless 0   PHQ-2 Score 0   Q1: Little interest or pleasure in doing things Not at all   Q2: Feeling down, depressed or hopeless Not at all   PHQ-2 Score 0       Social History     Tobacco Use    Smoking status: Never    Smokeless tobacco: Never   Substance Use Topics    Alcohol use: No             2023     9:55 AM   Alcohol Use   Prescreen: >3 drinks/day or >7 drinks/week? No     Reviewed orders with patient.  Reviewed health maintenance and updated orders accordingly - Yes    Breast Cancer Screenin/9/2022     7:07 AM   Breast CA Risk Assessment (FHS-7)   Do you have a family history of breast, colon, or ovarian cancer? No / Unknown       Mammogram Screening: Recommended annual mammography  Pertinent mammograms are reviewed under the imaging tab.    History of abnormal Pap smear: NO - age  30-65 PAP every 5 years with negative HPV co-testing recommended      Latest Ref Rng & Units 2022     4:16 PM 8/15/2016     4:49 PM   PAP / HPV   PAP  Negative for Intraepithelial Lesion or Malignancy (NILM)  Negative for squamous intraepithelial lesion or malignancy  Electronically signed by Karie Myers CT (ASCP) on 2016 at  3:45 PM      HPV 16 DNA Negative Negative     HPV 18 DNA Negative Negative     Other HR HPV Negative Negative       Reviewed and updated as needed this visit by clinical staff   Tobacco  Allergies  Meds  Problems  Med Hx  Surg Hx  Fam Hx  Soc   Hx        Reviewed and updated as needed this visit by Provider   Tobacco  Allergies  Meds  Problems  Med Hx  Surg Hx  Fam Hx  Soc   Hx       Past Medical History:   Diagnosis Date    Diabetes (H)       Past Surgical History:   Procedure Laterality Date    CRANIOTOMY Left 2017    Procedure: LEFT 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   HENT:  Negative for congestion, ear pain, hearing loss and sore throat.    Eyes:  Negative for pain and visual disturbance.   Respiratory:  Negative for cough and shortness of breath.    Cardiovascular:  Negative for chest pain, palpitations and peripheral edema.   Gastrointestinal:  Negative for abdominal pain, constipation, diarrhea, heartburn, hematochezia and nausea.   Breasts:  Negative for tenderness, breast mass and discharge.   Genitourinary:  Negative for dysuria, frequency, genital sores,  "hematuria, pelvic pain, urgency, vaginal bleeding and vaginal discharge.   Musculoskeletal:  Negative for arthralgias, joint swelling and myalgias.   Skin:  Negative for rash.   Neurological:  Negative for dizziness, weakness, headaches and paresthesias.   Psychiatric/Behavioral:  Negative for mood changes. The patient is not nervous/anxious.         OBJECTIVE:   /60 (BP Location: Right arm, Patient Position: Sitting, Cuff Size: Adult Regular)   Pulse 72   Temp 97.5  F (36.4  C) (Oral)   Ht 1.59 m (5' 2.6\")   Wt 47.2 kg (104 lb)   BMI 18.66 kg/m    Physical Exam      ASSESSMENT/PLAN:         COUNSELING:        She reports that she has never smoked. She has never used smokeless tobacco.          Liliana eHrrera MD  [facility]    Prior to immunization administration, verified patients identity using patient s name and date of birth. Please see Immunization Activity for additional information.     Screening Questionnaire for Adult Immunization    Are you sick today?   No   Do you have allergies to medications, food, a vaccine component or latex?   No   Have you ever had a serious reaction after receiving a vaccination?   No   Do you have a long-term health problem with heart, lung, kidney, or metabolic disease (e.g., diabetes), asthma, a blood disorder, no spleen, complement component deficiency, a cochlear implant, or a spinal fluid leak?  Are you on long-term aspirin therapy?   No   Do you have cancer, leukemia, HIV/AIDS, or any other immune system problem?   No   Do you have a parent, brother, or sister with an immune system problem?   No   In the past 3 months, have you taken medications that affect  your immune system, such as prednisone, other steroids, or anticancer drugs; drugs for the treatment of rheumatoid arthritis, Crohn s disease, or psoriasis; or have you had radiation treatments?   No   Have you had a seizure, or a brain or other nervous system problem?   No   During " the past year, have you received a transfusion of blood or blood    products, or been given immune (gamma) globulin or antiviral drug?   No   For women: Are you pregnant or is there a chance you could become       pregnant during the next month?   No   Have you received any vaccinations in the past 4 weeks?   No     Immunization questionnaire answers were all negative.      Patient instructed to remain in clinic for 15 minutes afterwards, and to report any adverse reactions.     Screening performed by URSZULA Vigil MA on 12/28/2023 at 4:40 PM.

## 2024-01-01 RX ORDER — LEVOTHYROXINE SODIUM 75 UG/1
75 TABLET ORAL DAILY
Qty: 90 TABLET | Refills: 1 | Status: SHIPPED | OUTPATIENT
Start: 2024-01-01

## 2024-01-29 ENCOUNTER — TELEPHONE (OUTPATIENT)
Dept: FAMILY MEDICINE | Facility: CLINIC | Age: 54
End: 2024-01-29
Payer: COMMERCIAL

## 2024-01-29 NOTE — TELEPHONE ENCOUNTER
Patient Quality Outreach    Patient is due for the following:   Breast Cancer Screening - Mammogram    Next Steps:   Schedule a Adult Preventative    Type of outreach:    Sent [employer] message.      Questions for provider review:    None           Andrea Behrend

## 2024-02-26 ENCOUNTER — MYC REFILL (OUTPATIENT)
Dept: FAMILY MEDICINE | Facility: CLINIC | Age: 54
End: 2024-02-26
Payer: COMMERCIAL

## 2024-02-26 DIAGNOSIS — L20.82 FLEXURAL ECZEMA: ICD-10-CM

## 2024-02-26 RX ORDER — FLUOCINONIDE 0.5 MG/G
OINTMENT TOPICAL
Qty: 30 G | Refills: 11 | Status: SHIPPED | OUTPATIENT
Start: 2024-02-26

## 2024-05-20 ENCOUNTER — TELEPHONE (OUTPATIENT)
Dept: FAMILY MEDICINE | Facility: CLINIC | Age: 54
End: 2024-05-20
Payer: COMMERCIAL

## 2024-05-20 NOTE — TELEPHONE ENCOUNTER
Patient Quality Outreach    Patient is due for the following:   Breast Cancer Screening - Mammogram    Next Steps:   Schedule a Adult Preventative    Type of outreach:    No outreach done today.  Patient has not responded to prior calls - last were from scheduling in April of 2024.  Will follow up in 90 days.      Questions for provider review:    None           Andrea Behrend

## 2024-06-14 ENCOUNTER — MYC MEDICAL ADVICE (OUTPATIENT)
Dept: FAMILY MEDICINE | Facility: CLINIC | Age: 54
End: 2024-06-14
Payer: COMMERCIAL

## 2024-06-14 DIAGNOSIS — R73.03 PREDIABETES: Primary | ICD-10-CM

## 2024-06-29 ENCOUNTER — LAB (OUTPATIENT)
Dept: LAB | Facility: CLINIC | Age: 54
End: 2024-06-29
Payer: COMMERCIAL

## 2024-06-29 DIAGNOSIS — R73.03 PREDIABETES: ICD-10-CM

## 2024-06-29 DIAGNOSIS — T38.2X5A ANTITHYROID AGENTS CAUSING ADVERSE EFFECT IN THERAPEUTIC USE: Primary | ICD-10-CM

## 2024-06-29 DIAGNOSIS — T38.2X5A ANTITHYROID AGENTS CAUSING ADVERSE EFFECT IN THERAPEUTIC USE: ICD-10-CM

## 2024-06-29 LAB
CREAT UR-MCNC: 137 MG/DL
HBA1C MFR BLD: 6.2 % (ref 0–5.6)
MICROALBUMIN UR-MCNC: 12.9 MG/L
MICROALBUMIN/CREAT UR: 9.42 MG/G CR (ref 0–25)
TSH SERPL DL<=0.005 MIU/L-ACNC: 3.93 UIU/ML (ref 0.3–4.2)

## 2024-06-29 PROCEDURE — 82043 UR ALBUMIN QUANTITATIVE: CPT

## 2024-06-29 PROCEDURE — 36415 COLL VENOUS BLD VENIPUNCTURE: CPT

## 2024-06-29 PROCEDURE — 84443 ASSAY THYROID STIM HORMONE: CPT

## 2024-06-29 PROCEDURE — 83036 HEMOGLOBIN GLYCOSYLATED A1C: CPT

## 2024-06-29 PROCEDURE — 82570 ASSAY OF URINE CREATININE: CPT

## 2024-08-19 ENCOUNTER — TELEPHONE (OUTPATIENT)
Dept: FAMILY MEDICINE | Facility: CLINIC | Age: 54
End: 2024-08-19
Payer: COMMERCIAL

## 2024-08-19 NOTE — TELEPHONE ENCOUNTER
Patient Quality Outreach    Patient is due for the following:   Breast Cancer Screening - Mammogram    Next Steps:   Schedule a Adult Preventative    Type of outreach:    Sent [employer] message.      Questions for provider review:    None           Andrea Behrend

## 2024-09-03 NOTE — TELEPHONE ENCOUNTER
Patient Quality Outreach    Patient is due for the following:   Breast Cancer Screening - Mammogram    Next Steps:   Schedule a Adult Preventative    Type of outreach:    Phone, left message for patient/parent to call back.    Next Steps:  Reach out within 90 days via [facility].    Max number of attempts reached: Yes. Will try again in 90 days if patient still on fail list.    Questions for provider review:    None           Andrea Behrend

## 2024-12-30 ENCOUNTER — LAB (OUTPATIENT)
Dept: LAB | Facility: CLINIC | Age: 54
End: 2024-12-30
Payer: COMMERCIAL

## 2024-12-30 DIAGNOSIS — T38.2X5A ANTITHYROID AGENTS CAUSING ADVERSE EFFECT IN THERAPEUTIC USE: Primary | ICD-10-CM

## 2024-12-30 DIAGNOSIS — R73.03 PREDIABETES: ICD-10-CM

## 2024-12-30 DIAGNOSIS — T38.2X5A ANTITHYROID AGENTS CAUSING ADVERSE EFFECT IN THERAPEUTIC USE: ICD-10-CM

## 2024-12-30 LAB
ALBUMIN SERPL BCG-MCNC: 4.5 G/DL (ref 3.5–5.2)
ALP SERPL-CCNC: 84 U/L (ref 40–150)
ALT SERPL W P-5'-P-CCNC: 16 U/L (ref 0–50)
ANION GAP SERPL CALCULATED.3IONS-SCNC: 11 MMOL/L (ref 7–15)
AST SERPL W P-5'-P-CCNC: 26 U/L (ref 0–45)
BILIRUB SERPL-MCNC: 0.3 MG/DL
BUN SERPL-MCNC: 9.7 MG/DL (ref 6–20)
CALCIUM SERPL-MCNC: 9.6 MG/DL (ref 8.8–10.4)
CHLORIDE SERPL-SCNC: 99 MMOL/L (ref 98–107)
CHOLEST SERPL-MCNC: 212 MG/DL
CREAT SERPL-MCNC: 0.59 MG/DL (ref 0.51–0.95)
EGFRCR SERPLBLD CKD-EPI 2021: >90 ML/MIN/1.73M2
EST. AVERAGE GLUCOSE BLD GHB EST-MCNC: 134 MG/DL
FASTING STATUS PATIENT QL REPORTED: NO
FASTING STATUS PATIENT QL REPORTED: NO
GLUCOSE SERPL-MCNC: 91 MG/DL (ref 70–99)
HBA1C MFR BLD: 6.3 % (ref 0–5.6)
HCO3 SERPL-SCNC: 26 MMOL/L (ref 22–29)
HDLC SERPL-MCNC: 76 MG/DL
LDLC SERPL CALC-MCNC: 112 MG/DL
NONHDLC SERPL-MCNC: 136 MG/DL
POTASSIUM SERPL-SCNC: 4.4 MMOL/L (ref 3.4–5.3)
PROT SERPL-MCNC: 8.3 G/DL (ref 6.4–8.3)
SODIUM SERPL-SCNC: 136 MMOL/L (ref 135–145)
T4 FREE SERPL-MCNC: 0.82 NG/DL (ref 0.9–1.7)
TRIGL SERPL-MCNC: 122 MG/DL
TSH SERPL DL<=0.005 MIU/L-ACNC: 33.6 UIU/ML (ref 0.3–4.2)

## 2024-12-30 PROCEDURE — 80053 COMPREHEN METABOLIC PANEL: CPT

## 2024-12-30 PROCEDURE — 80061 LIPID PANEL: CPT

## 2024-12-30 PROCEDURE — 83036 HEMOGLOBIN GLYCOSYLATED A1C: CPT

## 2024-12-30 PROCEDURE — 36415 COLL VENOUS BLD VENIPUNCTURE: CPT

## 2024-12-30 PROCEDURE — 84443 ASSAY THYROID STIM HORMONE: CPT

## 2024-12-30 PROCEDURE — 84439 ASSAY OF FREE THYROXINE: CPT

## 2025-01-13 ENCOUNTER — TELEPHONE (OUTPATIENT)
Dept: FAMILY MEDICINE | Facility: CLINIC | Age: 55
End: 2025-01-13
Payer: COMMERCIAL

## 2025-01-13 DIAGNOSIS — T38.2X5A ANTITHYROID AGENTS CAUSING ADVERSE EFFECT IN THERAPEUTIC USE: ICD-10-CM

## 2025-01-13 RX ORDER — LEVOTHYROXINE SODIUM 75 UG/1
75 TABLET ORAL DAILY
Qty: 90 TABLET | Refills: 1 | Status: SHIPPED | OUTPATIENT
Start: 2025-01-13

## 2025-01-13 NOTE — TELEPHONE ENCOUNTER
----- Message from Liliana Herrera sent at 1/13/2025 12:22 PM CST -----  Team - please call patient with results and send result letter with this information if patient desires for their records. [facility].      I thought you were coming in for your checkup...  please help pt schedule for later this year.      Your thyroid level is still low - is she taking her thyroid pill daily?  Looks like she may have run out?    I sent refill in today.      Prediabetes is getting a little worse  Your sugars are running high.    Be sure you are eating more fruit/veggies and low fat protein every day  Be sure to cut down on your rice/noodles/bread/potatoes/starchy and sweet foods  Be sure to move your body every day  We will check on this to make sure it is not getting worse.      Healthy kidney and liver tests

## 2025-01-13 NOTE — TELEPHONE ENCOUNTER
Patient returned call. Relayed provider message to patient, patient verbalizes understanding of provider message.    She states that she does take her thyroid medication but sometimes she forgets to take it, did tell her she needs to take it daily. Also informed her that pcp had sent in new prescription so when she finishes the ones at home, she can go  new ones at her pharmacy in [place]. Patient verbalizes understanding.     Patient states she cancelled her appt this month, she will call back to reschedule when she takes a better look at her schedule to see when she can come in.       Alecia Kunz, MSN, RN   [facility]

## 2025-01-13 NOTE — TELEPHONE ENCOUNTER
Attempted to contact patient. Home number invalid, left voicemail requesting return call on patient's mobile number.    Please relay message from Dr. Herrera should patient call back.    Tiff Dave RN  [facility]

## 2025-02-15 ENCOUNTER — HEALTH MAINTENANCE LETTER (OUTPATIENT)
Age: 55
End: 2025-02-15

## 2025-05-27 ENCOUNTER — OFFICE VISIT (OUTPATIENT)
Dept: URGENT CARE | Facility: URGENT CARE | Age: 55
End: 2025-05-27
Payer: COMMERCIAL

## 2025-05-27 VITALS
HEART RATE: 80 BPM | OXYGEN SATURATION: 99 % | RESPIRATION RATE: 16 BRPM | WEIGHT: 98.7 LBS | TEMPERATURE: 98 F | BODY MASS INDEX: 17.71 KG/M2
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
## 2025-05-27 DIAGNOSIS — M54.2 NECK PAIN: Primary | ICD-10-CM

## 2025-05-27 DIAGNOSIS — R07.89 STERNAL PAIN: ICD-10-CM

## 2025-05-27 DIAGNOSIS — M54.6 ACUTE BILATERAL THORACIC BACK PAIN: ICD-10-CM

## 2025-05-27 PROCEDURE — 99214 OFFICE O/P EST MOD 30 MIN: CPT | Performed by: PHYSICIAN ASSISTANT
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
## 2025-06-09 SDOH — HEALTH STABILITY: PHYSICAL HEALTH: ON AVERAGE, HOW MANY MINUTES DO YOU ENGAGE IN EXERCISE AT THIS LEVEL?: 30 MIN

## 2025-06-09 SDOH — HEALTH STABILITY: PHYSICAL HEALTH: ON AVERAGE, HOW MANY DAYS PER WEEK DO YOU ENGAGE IN MODERATE TO STRENUOUS EXERCISE (LIKE A BRISK WALK)?: 3 DAYS

## 2025-06-09 ASSESSMENT — SOCIAL DETERMINANTS OF HEALTH (SDOH): HOW OFTEN DO YOU GET TOGETHER WITH FRIENDS OR RELATIVES?: THREE TIMES A WEEK

## 2025-06-11 ENCOUNTER — OFFICE VISIT (OUTPATIENT)
Dept: FAMILY MEDICINE | Facility: CLINIC | Age: 55
End: 2025-06-11
Payer: COMMERCIAL

## 2025-06-11 ENCOUNTER — ORDERS ONLY (AUTO-RELEASED) (OUTPATIENT)
Dept: FAMILY MEDICINE | Facility: CLINIC | Age: 55
End: 2025-06-11

## 2025-06-11 VITALS
TEMPERATURE: 97.2 F | WEIGHT: 102 LBS | RESPIRATION RATE: 16 BRPM | BODY MASS INDEX: 19.26 KG/M2 | OXYGEN SATURATION: 100 % | HEART RATE: 67 BPM | SYSTOLIC BLOOD PRESSURE: 150 MMHG | DIASTOLIC BLOOD PRESSURE: 91 MMHG | HEIGHT: 61 IN

## 2025-06-11 DIAGNOSIS — Z12.31 ENCOUNTER FOR SCREENING MAMMOGRAM FOR MALIGNANT NEOPLASM OF BREAST: ICD-10-CM

## 2025-06-11 DIAGNOSIS — Z00.00 ROUTINE GENERAL MEDICAL EXAMINATION AT A HEALTH CARE FACILITY: Primary | ICD-10-CM

## 2025-06-11 DIAGNOSIS — R03.0 ELEVATED BLOOD PRESSURE READING WITHOUT DIAGNOSIS OF HYPERTENSION: ICD-10-CM

## 2025-06-11 DIAGNOSIS — Z12.11 COLON CANCER SCREENING: ICD-10-CM

## 2025-06-11 DIAGNOSIS — T38.2X5A ANTITHYROID AGENTS CAUSING ADVERSE EFFECT IN THERAPEUTIC USE: ICD-10-CM

## 2025-06-11 DIAGNOSIS — Z23 ENCOUNTER FOR IMMUNIZATION: ICD-10-CM

## 2025-06-11 DIAGNOSIS — M65.30 TRIGGER FINGER, ACQUIRED: ICD-10-CM

## 2025-06-11 DIAGNOSIS — Z78.9 NONIMMUNE TO HEPATITIS B VIRUS: ICD-10-CM

## 2025-06-11 DIAGNOSIS — R73.03 PREDIABETES: ICD-10-CM

## 2025-06-11 DIAGNOSIS — D32.9 MENINGIOMA (H): ICD-10-CM

## 2025-06-11 LAB
ALBUMIN SERPL BCG-MCNC: 4.4 G/DL (ref 3.5–5.2)
ALP SERPL-CCNC: 81 U/L (ref 40–150)
ALT SERPL W P-5'-P-CCNC: 14 U/L (ref 0–50)
ANION GAP SERPL CALCULATED.3IONS-SCNC: 11 MMOL/L (ref 7–15)
AST SERPL W P-5'-P-CCNC: 29 U/L (ref 0–45)
BILIRUB SERPL-MCNC: 0.3 MG/DL
BUN SERPL-MCNC: 7.5 MG/DL (ref 6–20)
CALCIUM SERPL-MCNC: 9.6 MG/DL (ref 8.8–10.4)
CHLORIDE SERPL-SCNC: 104 MMOL/L (ref 98–107)
CREAT SERPL-MCNC: 0.58 MG/DL (ref 0.51–0.95)
EGFRCR SERPLBLD CKD-EPI 2021: >90 ML/MIN/1.73M2
EST. AVERAGE GLUCOSE BLD GHB EST-MCNC: 134 MG/DL
GLUCOSE SERPL-MCNC: 112 MG/DL (ref 70–99)
HBA1C MFR BLD: 6.3 % (ref 0–5.6)
HBV SURFACE AG SERPL QL IA: NONREACTIVE
HCO3 SERPL-SCNC: 24 MMOL/L (ref 22–29)
POTASSIUM SERPL-SCNC: 4.1 MMOL/L (ref 3.4–5.3)
PROT SERPL-MCNC: 8 G/DL (ref 6.4–8.3)
SODIUM SERPL-SCNC: 139 MMOL/L (ref 135–145)
T4 FREE SERPL-MCNC: 0.94 NG/DL (ref 0.9–1.7)
TSH SERPL DL<=0.005 MIU/L-ACNC: 12.8 UIU/ML (ref 0.3–4.2)

## 2025-06-11 PROCEDURE — 99396 PREV VISIT EST AGE 40-64: CPT | Performed by: FAMILY MEDICINE

## 2025-06-11 PROCEDURE — 84439 ASSAY OF FREE THYROXINE: CPT | Performed by: FAMILY MEDICINE

## 2025-06-11 PROCEDURE — 3077F SYST BP >= 140 MM HG: CPT | Performed by: FAMILY MEDICINE

## 2025-06-11 PROCEDURE — 83036 HEMOGLOBIN GLYCOSYLATED A1C: CPT | Performed by: FAMILY MEDICINE

## 2025-06-11 PROCEDURE — 36415 COLL VENOUS BLD VENIPUNCTURE: CPT | Performed by: FAMILY MEDICINE

## 2025-06-11 PROCEDURE — 3079F DIAST BP 80-89 MM HG: CPT | Performed by: FAMILY MEDICINE

## 2025-06-11 PROCEDURE — 3044F HG A1C LEVEL LT 7.0%: CPT | Performed by: FAMILY MEDICINE

## 2025-06-11 PROCEDURE — 84443 ASSAY THYROID STIM HORMONE: CPT | Performed by: FAMILY MEDICINE

## 2025-06-11 PROCEDURE — 99214 OFFICE O/P EST MOD 30 MIN: CPT | Mod: 25 | Performed by: FAMILY MEDICINE

## 2025-06-11 PROCEDURE — 87340 HEPATITIS B SURFACE AG IA: CPT | Performed by: FAMILY MEDICINE

## 2025-06-11 PROCEDURE — 80053 COMPREHEN METABOLIC PANEL: CPT | Performed by: FAMILY MEDICINE

## 2025-06-11 NOTE — PROGRESS NOTES
Prior to immunization administration, verified patients identity using patient s name and date of birth. Please see Immunization Activity for additional information.     Screening Questionnaire for Adult Immunization    Are you sick today?   No   Do you have allergies to medications, food, a vaccine component or latex?   No   Have you ever had a serious reaction after receiving a vaccination?   No   Do you have a long-term health problem with heart, lung, kidney, or metabolic disease (e.g., diabetes), asthma, a blood disorder, no spleen, complement component deficiency, a cochlear implant, or a spinal fluid leak?  Are you on long-term aspirin therapy?   No   Do you have cancer, leukemia, HIV/AIDS, or any other immune system problem?   No   Do you have a parent, brother, or sister with an immune system problem?   No   In the past 3 months, have you taken medications that affect  your immune system, such as prednisone, other steroids, or anticancer drugs; drugs for the treatment of rheumatoid arthritis, Crohn s disease, or psoriasis; or have you had radiation treatments?   No   Have you had a seizure, or a brain or other nervous system problem?   No   During the past year, have you received a transfusion of blood or blood    products, or been given immune (gamma) globulin or antiviral drug?   No   For women: Are you pregnant or is there a chance you could become       pregnant during the next month?   No   Have you received any vaccinations in the past 4 weeks?   No     Immunization questionnaire Preventive Care Visit  [facility]  Liliana Herrera MD, Family Medicine  Jun 11, 2025  {Provider  Link to SmartSet :665748}    {PROVIDER CHARTING PREFERENCE:165427}    Hank Birch is a 55 year old, presenting for the following:  Physical        6/11/2025    10:57 AM   Additional Questions   Roomed by Bushra   Accompanied by self        Via the Health Maintenance questionnaire, the patient has  reported the following services have been completed -Eye Exam: DON'T REMEMBER 2025-06-24, this information has been sent to the abstraction team.    HPI  ***   {MA/LPN/RN Pre-Provider Visit Orders- hCG/UA/Strep (Optional):689177}  {SUPERLIST (Optional):814985}  {additonal problems for provider to add (Optional):320311}  Advance Care Planning  {The storyboard will display whether the patient has ACP docs on file. Hover over the Code section in the storyboard to access the ACP documents. :825303}  {(AWV REQUIRED) Advance Care Planning Reviewed:291503}        6/9/2025   General Health   How would you rate your overall physical health? Good   Feel stress (tense, anxious, or unable to sleep) Not at all         6/9/2025   Nutrition   Three or more servings of calcium each day? Yes   Diet: Vegetarian/vegan   How many servings of fruit and vegetables per day? (!) 2-3   How many sweetened beverages each day? 0-1         6/9/2025   Exercise   Days per week of moderate/strenous exercise 3 days   Average minutes spent exercising at this level 30 min         6/9/2025   Social Factors   Frequency of gathering with friends or relatives Three times a week   Worry food won't last until get money to buy more No   Food not last or not have enough money for food? No   Do you have housing? (Housing is defined as stable permanent housing and does not include staying outside in a car, in a tent, in an abandoned building, in an overnight shelter, or couch-surfing.) Yes   Are you worried about losing your housing? No   Lack of transportation? No   Unable to get utilities (heat,electricity)? No         6/9/2025   Fall Risk   Fallen 2 or more times in the past year? No   Trouble with walking or balance? No          6/9/2025   Dental   Dentist two times every year? (!) NO         Today's PHQ-2 Score:       6/11/2025    10:39 AM   PHQ-2 ( 1999 Pfizer)   Q1: Little interest or pleasure in doing things 0   Q2: Feeling down, depressed or hopeless  0   PHQ-2 Score 0    Q1: Little interest or pleasure in doing things Not at all   Q2: Feeling down, depressed or hopeless Not at all   PHQ-2 Score 0       Patient-reported           6/9/2025   Substance Use   Alcohol more than 3/day or more than 7/wk No   Do you use any other substances recreationally? No     Social History     Tobacco Use    Smoking status: Never    Smokeless tobacco: Never   Vaping Use    Vaping status: Never Used   Substance Use Topics    Alcohol use: No    Drug use: No     {Provider  If there are gaps in the social history shown above, please follow the link to update and then refresh the note Link to Social and Substance History :117912}     {Mammogram Decision Support (Optional):285257}        6/9/2025   STI Screening   New sexual partner(s) since last STI/HIV test? No     History of abnormal Pap smear: { :450502}        Latest Ref Rng & Units 9/9/2022     4:16 PM 8/15/2016     4:49 PM   PAP / HPV   PAP  Negative for Intraepithelial Lesion or Malignancy (NILM)  Negative for squamous intraepithelial lesion or malignancy  Electronically signed by Karie Myers CT (ASCP) on 8/24/2016 at  3:45 PM      HPV 16 DNA Negative Negative     HPV 18 DNA Negative Negative     Other HR HPV Negative Negative       ASCVD Risk   The 10-year ASCVD risk score (Cale PRASAD, et al., 2019) is: 4.3%    Values used to calculate the score:      Age: 55 years      Sex: Female      Is Non- : No      Diabetic: Yes      Tobacco smoker: No      Systolic Blood Pressure: 155 mmHg      Is BP treated: No      HDL Cholesterol: 76 mg/dL      Total Cholesterol: 212 mg/dL    {Link to Fracture Risk Assessment Tool (Optional):950312}    {Provider  REQUIRED FOR AWV Use the storyboard to review patient history, after sections have been marked as reviewed, refresh note to capture documentation:289803}   Reviewed and updated as needed this visit by Provider                    {HISTORY OPTIONS  "(Optional):733009}    {ROS Picklists (Optional):225087}     Objective    Exam  BP (!) 155/84 (BP Location: Left arm, Patient Position: Sitting, Cuff Size: Adult Small)   Pulse 67   Temp 97.2  F (36.2  C) (Temporal)   Resp 16   Ht 1.56 m (5' 1.42\")   Wt 46.3 kg (102 lb)   SpO2 100%   BMI 19.01 kg/m     Estimated body mass index is 19.01 kg/m  as calculated from the following:    Height as of this encounter: 1.56 m (5' 1.42\").    Weight as of this encounter: 46.3 kg (102 lb).    Physical Exam  {Exam Choices (Optional):658605}        Signed Electronically by: Liliana Herrera MD  {Email feedback regarding this note to [email or web address]   :927180}      Patient instructed to remain in clinic for 15 minutes afterwards, and to report any adverse reactions.     Screening performed by Bushra Shultz MA on 6/11/2025 at 11:01 AM.   "
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
## 2025-06-11 NOTE — PROGRESS NOTES
Prior to immunization administration, verified patients identity using patient s name and date of birth. Please see Immunization Activity for additional information.     Screening Questionnaire for Adult Immunization    Are you sick today?   No   Do you have allergies to medications, food, a vaccine component or latex?   No   Have you ever had a serious reaction after receiving a vaccination?   No   Do you have a long-term health problem with heart, lung, kidney, or metabolic disease (e.g., diabetes), asthma, a blood disorder, no spleen, complement component deficiency, a cochlear implant, or a spinal fluid leak?  Are you on long-term aspirin therapy?   No   Do you have cancer, leukemia, HIV/AIDS, or any other immune system problem?   No   Do you have a parent, brother, or sister with an immune system problem?   No   In the past 3 months, have you taken medications that affect  your immune system, such as prednisone, other steroids, or anticancer drugs; drugs for the treatment of rheumatoid arthritis, Crohn s disease, or psoriasis; or have you had radiation treatments?   No   Have you had a seizure, or a brain or other nervous system problem?   No   During the past year, have you received a transfusion of blood or blood    products, or been given immune (gamma) globulin or antiviral drug?   No   For women: Are you pregnant or is there a chance you could become       pregnant during the next month?   No   Have you received any vaccinations in the past 4 weeks?   No     Immunization questionnaire answers were all negative.      Patient instructed to remain in clinic for 15 minutes afterwards, and to report any adverse reactions.     Screening performed by Bushra Shultz MA on 6/11/2025 at 11:02 AM.

## 2025-06-11 NOTE — PATIENT INSTRUCTIONS
[facility]  [address], [place]   [phone]    Cycling gloves to wear with gardening and cooking     Patient Education   Preventive Care Advice   This is general advice given by our system to help you stay healthy. However, your care team may have specific advice just for you. Please talk to your care team about your preventive care needs.  Nutrition  Eat 5 or more servings of fruits and vegetables each day.  Try wheat bread, brown rice and whole grain pasta (instead of white bread, rice, and pasta).  Get enough calcium and vitamin D. Check the label on foods and aim for 100% of the RDA (recommended daily allowance).  Lifestyle  Exercise at least 150 minutes each week  (30 minutes a day, 5 days a week).  Do muscle strengthening activities 2 days a week. These help control your weight and prevent disease.  No smoking.  Wear sunscreen to prevent skin cancer.  Have a dental exam and cleaning every 6 months.  Yearly exams  See your health care team every year to talk about:  Any changes in your health.  Any medicines your care team has prescribed.  Preventive care, family planning, and ways to prevent chronic diseases.  Shots (vaccines)   HPV shots (up to age 26), if you've never had them before.  Hepatitis B shots (up to age 59), if you've never had them before.  COVID-19 shot: Get this shot when it's due.  Flu shot: Get a flu shot every year.  Tetanus shot: Get a tetanus shot every 10 years.  Pneumococcal, hepatitis A, and RSV shots: Ask your care team if you need these based on your risk.  Shingles shot (for age 50 and up)  General health tests  Diabetes screening:  Starting at age 35, Get screened for diabetes at least every 3 years.  If you are younger than age 35, ask your care team if you should be screened for diabetes.  Cholesterol test: At age 39, start having a cholesterol test every 5 years, or more often if advised.  Bone density scan (DEXA): At age 50, ask your care team if you  should have this scan for osteoporosis (brittle bones).  Hepatitis C: Get tested at least once in your life.  STIs (sexually transmitted infections)  Before age 24: Ask your care team if you should be screened for STIs.  After age 24: Get screened for STIs if you're at risk. You are at risk for STIs (including HIV) if:  You are sexually active with more than one person.  You don't use condoms every time.  You or a partner was diagnosed with a sexually transmitted infection.  If you are at risk for HIV, ask about PrEP medicine to prevent HIV.  Get tested for HIV at least once in your life, whether you are at risk for HIV or not.  Cancer screening tests  Cervical cancer screening: If you have a cervix, begin getting regular cervical cancer screening tests starting at age 21.  Breast cancer scan (mammogram): If you've ever had breasts, begin having regular mammograms starting at age 40. This is a scan to check for breast cancer.  Colon cancer screening: It is important to start screening for colon cancer at age 45.  Have a colonoscopy test every 10 years (or more often if you're at risk) Or, ask your provider about stool tests like a FIT test every year or Cologuard test every 3 years.  To learn more about your testing options, visit:   .  For help making a decision, visit:   https://bit.ly/qx66434.  Prostate cancer screening test: If you have a prostate, ask your care team if a prostate cancer screening test (PSA) at age 55 is right for you.  Lung cancer screening: If you are a current or former smoker ages 50 to 80, ask your care team if ongoing lung cancer screenings are right for you.  For informational purposes only. Not to replace the advice of your health care provider. Copyright   2023 [employer]. All rights reserved. Clinically reviewed by the [facility]. [employer] 472587 - REV 01/24.

## 2025-06-12 ENCOUNTER — RESULTS FOLLOW-UP (OUTPATIENT)
Dept: FAMILY MEDICINE | Facility: CLINIC | Age: 55
End: 2025-06-12

## 2025-06-12 DIAGNOSIS — T38.2X5A ANTITHYROID AGENTS CAUSING ADVERSE EFFECT IN THERAPEUTIC USE: ICD-10-CM

## 2025-06-12 RX ORDER — LEVOTHYROXINE SODIUM 88 UG/1
88 TABLET ORAL DAILY
Qty: 90 TABLET | Refills: 4 | Status: SHIPPED | OUTPATIENT
Start: 2025-06-12

## 2025-06-25 LAB — NONINV COLON CA DNA+OCC BLD SCRN STL QL: NEGATIVE

## 2025-06-26 ENCOUNTER — ANCILLARY PROCEDURE (OUTPATIENT)
Dept: MAMMOGRAPHY | Facility: HOSPITAL | Age: 55
End: 2025-06-26
Attending: FAMILY MEDICINE
Payer: COMMERCIAL

## 2025-06-26 DIAGNOSIS — Z12.31 ENCOUNTER FOR SCREENING MAMMOGRAM FOR MALIGNANT NEOPLASM OF BREAST: ICD-10-CM

## 2025-06-26 PROCEDURE — 77063 BREAST TOMOSYNTHESIS BI: CPT

## 2025-07-28 ENCOUNTER — TELEPHONE (OUTPATIENT)
Dept: FAMILY MEDICINE | Facility: CLINIC | Age: 55
End: 2025-07-28
Payer: COMMERCIAL